# Patient Record
Sex: MALE | Race: WHITE
[De-identification: names, ages, dates, MRNs, and addresses within clinical notes are randomized per-mention and may not be internally consistent; named-entity substitution may affect disease eponyms.]

---

## 2018-02-22 ENCOUNTER — RESULT REVIEW (OUTPATIENT)
Age: 68
End: 2018-02-22

## 2018-02-22 ENCOUNTER — OUTPATIENT (OUTPATIENT)
Dept: OUTPATIENT SERVICES | Facility: HOSPITAL | Age: 68
LOS: 1 days | Discharge: HOME | End: 2018-02-22
Payer: MEDICARE

## 2018-02-22 VITALS
SYSTOLIC BLOOD PRESSURE: 119 MMHG | TEMPERATURE: 97 F | OXYGEN SATURATION: 100 % | WEIGHT: 197.09 LBS | HEIGHT: 74 IN | DIASTOLIC BLOOD PRESSURE: 73 MMHG | RESPIRATION RATE: 16 BRPM | HEART RATE: 56 BPM

## 2018-02-22 DIAGNOSIS — Z98.890 OTHER SPECIFIED POSTPROCEDURAL STATES: Chronic | ICD-10-CM

## 2018-02-22 DIAGNOSIS — S62.91XA UNSPECIFIED FRACTURE OF RIGHT WRIST AND HAND, INITIAL ENCOUNTER FOR CLOSED FRACTURE: Chronic | ICD-10-CM

## 2018-02-22 PROCEDURE — 88305 TISSUE EXAM BY PATHOLOGIST: CPT | Mod: 26

## 2018-02-22 RX ORDER — MELOXICAM 15 MG/1
1 TABLET ORAL
Qty: 0 | Refills: 0 | COMMUNITY

## 2018-02-22 RX ORDER — SODIUM CHLORIDE 9 MG/ML
1000 INJECTION INTRAMUSCULAR; INTRAVENOUS; SUBCUTANEOUS
Qty: 0 | Refills: 0 | Status: DISCONTINUED | OUTPATIENT
Start: 2018-02-22 | End: 2018-03-09

## 2018-02-22 NOTE — ASU PATIENT PROFILE, ADULT - PSH
Hand fracture, right    History of appendectomy    History of arthroscopy of both knees    History of lumbar laminectomy

## 2018-02-26 LAB — SURGICAL PATHOLOGY FINAL REPORT - CH: SIGNIFICANT CHANGE UP

## 2018-03-02 DIAGNOSIS — Z85.828 PERSONAL HISTORY OF OTHER MALIGNANT NEOPLASM OF SKIN: ICD-10-CM

## 2018-03-02 DIAGNOSIS — F17.290 NICOTINE DEPENDENCE, OTHER TOBACCO PRODUCT, UNCOMPLICATED: ICD-10-CM

## 2018-03-02 DIAGNOSIS — K64.8 OTHER HEMORRHOIDS: ICD-10-CM

## 2018-03-02 DIAGNOSIS — K62.1 RECTAL POLYP: ICD-10-CM

## 2018-03-02 DIAGNOSIS — K57.30 DIVERTICULOSIS OF LARGE INTESTINE WITHOUT PERFORATION OR ABSCESS WITHOUT BLEEDING: ICD-10-CM

## 2018-03-02 DIAGNOSIS — Z86.010 PERSONAL HISTORY OF COLONIC POLYPS: ICD-10-CM

## 2018-03-02 DIAGNOSIS — Z09 ENCOUNTER FOR FOLLOW-UP EXAMINATION AFTER COMPLETED TREATMENT FOR CONDITIONS OTHER THAN MALIGNANT NEOPLASM: ICD-10-CM

## 2018-03-02 DIAGNOSIS — M19.90 UNSPECIFIED OSTEOARTHRITIS, UNSPECIFIED SITE: ICD-10-CM

## 2018-03-02 DIAGNOSIS — K63.5 POLYP OF COLON: ICD-10-CM

## 2018-03-02 DIAGNOSIS — E78.5 HYPERLIPIDEMIA, UNSPECIFIED: ICD-10-CM

## 2019-01-05 ENCOUNTER — INPATIENT (INPATIENT)
Facility: HOSPITAL | Age: 69
LOS: 1 days | Discharge: HOME | End: 2019-01-07
Attending: INTERNAL MEDICINE | Admitting: INTERNAL MEDICINE
Payer: MEDICARE

## 2019-01-05 VITALS — WEIGHT: 192.9 LBS | HEIGHT: 75 IN

## 2019-01-05 DIAGNOSIS — Z98.890 OTHER SPECIFIED POSTPROCEDURAL STATES: Chronic | ICD-10-CM

## 2019-01-05 DIAGNOSIS — S62.91XA UNSPECIFIED FRACTURE OF RIGHT WRIST AND HAND, INITIAL ENCOUNTER FOR CLOSED FRACTURE: Chronic | ICD-10-CM

## 2019-01-05 LAB
ALBUMIN SERPL ELPH-MCNC: 3.7 G/DL — SIGNIFICANT CHANGE UP (ref 3.3–5)
ALP SERPL-CCNC: 119 U/L — SIGNIFICANT CHANGE UP (ref 40–120)
ALT FLD-CCNC: 39 U/L — SIGNIFICANT CHANGE UP (ref 12–78)
ANION GAP SERPL CALC-SCNC: 10 MMOL/L — SIGNIFICANT CHANGE UP (ref 5–17)
APTT BLD: 35.7 SEC — SIGNIFICANT CHANGE UP (ref 27.5–36.3)
AST SERPL-CCNC: 31 U/L — SIGNIFICANT CHANGE UP (ref 15–37)
BILIRUB SERPL-MCNC: 0.4 MG/DL — SIGNIFICANT CHANGE UP (ref 0.2–1.2)
BUN SERPL-MCNC: 24 MG/DL — HIGH (ref 7–23)
CALCIUM SERPL-MCNC: 8.5 MG/DL — SIGNIFICANT CHANGE UP (ref 8.5–10.1)
CHLORIDE SERPL-SCNC: 105 MMOL/L — SIGNIFICANT CHANGE UP (ref 96–108)
CO2 SERPL-SCNC: 24 MMOL/L — SIGNIFICANT CHANGE UP (ref 22–31)
CREAT SERPL-MCNC: 1 MG/DL — SIGNIFICANT CHANGE UP (ref 0.5–1.3)
GLUCOSE SERPL-MCNC: 160 MG/DL — HIGH (ref 70–99)
HCT VFR BLD CALC: 44.2 % — SIGNIFICANT CHANGE UP (ref 39–50)
HGB BLD-MCNC: 15 G/DL — SIGNIFICANT CHANGE UP (ref 13–17)
INR BLD: 1.13 RATIO — SIGNIFICANT CHANGE UP (ref 0.88–1.16)
LIDOCAIN IGE QN: 156 U/L — SIGNIFICANT CHANGE UP (ref 73–393)
MAGNESIUM SERPL-MCNC: 2.4 MG/DL — SIGNIFICANT CHANGE UP (ref 1.6–2.6)
MCHC RBC-ENTMCNC: 31.4 PG — SIGNIFICANT CHANGE UP (ref 27–34)
MCHC RBC-ENTMCNC: 33.9 GM/DL — SIGNIFICANT CHANGE UP (ref 32–36)
MCV RBC AUTO: 92.5 FL — SIGNIFICANT CHANGE UP (ref 80–100)
NRBC # BLD: 0 /100 WBCS — SIGNIFICANT CHANGE UP (ref 0–0)
NT-PROBNP SERPL-SCNC: 217 PG/ML — HIGH (ref 0–125)
PLATELET # BLD AUTO: 237 K/UL — SIGNIFICANT CHANGE UP (ref 150–400)
POTASSIUM SERPL-MCNC: 4 MMOL/L — SIGNIFICANT CHANGE UP (ref 3.5–5.3)
POTASSIUM SERPL-SCNC: 4 MMOL/L — SIGNIFICANT CHANGE UP (ref 3.5–5.3)
PROT SERPL-MCNC: 7.3 GM/DL — SIGNIFICANT CHANGE UP (ref 6–8.3)
PROTHROM AB SERPL-ACNC: 12.6 SEC — SIGNIFICANT CHANGE UP (ref 10–12.9)
RBC # BLD: 4.78 M/UL — SIGNIFICANT CHANGE UP (ref 4.2–5.8)
RBC # FLD: 12.7 % — SIGNIFICANT CHANGE UP (ref 10.3–14.5)
SODIUM SERPL-SCNC: 139 MMOL/L — SIGNIFICANT CHANGE UP (ref 135–145)
TROPONIN I SERPL-MCNC: 0.17 NG/ML — HIGH (ref 0.01–0.04)
WBC # BLD: 9.62 K/UL — SIGNIFICANT CHANGE UP (ref 3.8–10.5)
WBC # FLD AUTO: 9.62 K/UL — SIGNIFICANT CHANGE UP (ref 3.8–10.5)

## 2019-01-05 PROCEDURE — 99285 EMERGENCY DEPT VISIT HI MDM: CPT

## 2019-01-05 PROCEDURE — 93010 ELECTROCARDIOGRAM REPORT: CPT

## 2019-01-05 PROCEDURE — 71045 X-RAY EXAM CHEST 1 VIEW: CPT | Mod: 26

## 2019-01-05 RX ORDER — SODIUM CHLORIDE 9 MG/ML
1000 INJECTION INTRAMUSCULAR; INTRAVENOUS; SUBCUTANEOUS ONCE
Qty: 0 | Refills: 0 | Status: COMPLETED | OUTPATIENT
Start: 2019-01-05 | End: 2019-01-05

## 2019-01-05 RX ORDER — ASPIRIN/CALCIUM CARB/MAGNESIUM 324 MG
325 TABLET ORAL ONCE
Qty: 0 | Refills: 0 | Status: COMPLETED | OUTPATIENT
Start: 2019-01-05 | End: 2019-01-05

## 2019-01-05 RX ORDER — APIXABAN 2.5 MG/1
5 TABLET, FILM COATED ORAL EVERY 12 HOURS
Qty: 0 | Refills: 0 | Status: DISCONTINUED | OUTPATIENT
Start: 2019-01-05 | End: 2019-01-06

## 2019-01-05 RX ADMIN — APIXABAN 5 MILLIGRAM(S): 2.5 TABLET, FILM COATED ORAL at 23:14

## 2019-01-05 RX ADMIN — SODIUM CHLORIDE 1000 MILLILITER(S): 9 INJECTION INTRAMUSCULAR; INTRAVENOUS; SUBCUTANEOUS at 23:27

## 2019-01-05 RX ADMIN — SODIUM CHLORIDE 1000 MILLILITER(S): 9 INJECTION INTRAMUSCULAR; INTRAVENOUS; SUBCUTANEOUS at 23:08

## 2019-01-05 RX ADMIN — Medication 325 MILLIGRAM(S): at 22:08

## 2019-01-05 RX ADMIN — SODIUM CHLORIDE 1000 MILLILITER(S): 9 INJECTION INTRAMUSCULAR; INTRAVENOUS; SUBCUTANEOUS at 22:08

## 2019-01-05 RX ADMIN — SODIUM CHLORIDE 1000 MILLILITER(S): 9 INJECTION INTRAMUSCULAR; INTRAVENOUS; SUBCUTANEOUS at 22:27

## 2019-01-05 NOTE — ED ADULT NURSE NOTE - NSIMPLEMENTINTERV_GEN_ALL_ED
Implemented All Universal Safety Interventions:  [place] to call system. Call bell, personal items and telephone within reach. Instruct patient to call for assistance. Room bathroom lighting operational. Non-slip footwear when patient is off stretcher. Physically safe environment: no spills, clutter or unnecessary equipment. Stretcher in lowest position, wheels locked, appropriate side rails in place.

## 2019-01-05 NOTE — ED ADULT NURSE NOTE - OBJECTIVE STATEMENT
pt presents to the ed c/o hr racing, pt states it began around 1300. Pt is in afib rvr, ekg done, set up on monitor. Denies sob / cp

## 2019-01-05 NOTE — ED PROVIDER NOTE - OBJECTIVE STATEMENT
67 yo male with h/o HTN, HLD and afib, on Eliquis c/o chest pain.  Patient had 4-5 hours of chest pain this afternoon with the sensation that he was in afib.  No sob. No fever.  Missed his am dose of lopressor and eliquis, took around 2pm after symptoms had started.  Now symptom free.

## 2019-01-05 NOTE — ED ADULT NURSE NOTE - CHPI ED NUR SYMPTOMS NEG
no vomiting/no nausea/no weakness/no pain/no fever/no chills/no tingling/no decreased eating/drinking/no dizziness

## 2019-01-06 PROBLEM — D09.9 CARCINOMA IN SITU, UNSPECIFIED: Chronic | Status: ACTIVE | Noted: 2018-02-22

## 2019-01-06 PROBLEM — M19.90 UNSPECIFIED OSTEOARTHRITIS, UNSPECIFIED SITE: Chronic | Status: ACTIVE | Noted: 2018-02-22

## 2019-01-06 PROBLEM — E78.5 HYPERLIPIDEMIA, UNSPECIFIED: Chronic | Status: ACTIVE | Noted: 2018-02-22

## 2019-01-06 LAB
ANION GAP SERPL CALC-SCNC: 6 MMOL/L — SIGNIFICANT CHANGE UP (ref 5–17)
BASOPHILS # BLD AUTO: 0.03 K/UL — SIGNIFICANT CHANGE UP (ref 0–0.2)
BASOPHILS NFR BLD AUTO: 0.3 % — SIGNIFICANT CHANGE UP (ref 0–2)
BUN SERPL-MCNC: 24 MG/DL — HIGH (ref 7–23)
CALCIUM SERPL-MCNC: 8 MG/DL — LOW (ref 8.5–10.1)
CHLORIDE SERPL-SCNC: 114 MMOL/L — HIGH (ref 96–108)
CHOLEST SERPL-MCNC: 168 MG/DL — SIGNIFICANT CHANGE UP (ref 10–199)
CO2 SERPL-SCNC: 23 MMOL/L — SIGNIFICANT CHANGE UP (ref 22–31)
CREAT SERPL-MCNC: 0.74 MG/DL — SIGNIFICANT CHANGE UP (ref 0.5–1.3)
EOSINOPHIL # BLD AUTO: 0.16 K/UL — SIGNIFICANT CHANGE UP (ref 0–0.5)
EOSINOPHIL NFR BLD AUTO: 1.8 % — SIGNIFICANT CHANGE UP (ref 0–6)
GLUCOSE SERPL-MCNC: 113 MG/DL — HIGH (ref 70–99)
HCT VFR BLD CALC: 37.7 % — LOW (ref 39–50)
HDLC SERPL-MCNC: 52 MG/DL — SIGNIFICANT CHANGE UP
HGB BLD-MCNC: 12.9 G/DL — LOW (ref 13–17)
IMM GRANULOCYTES NFR BLD AUTO: 0.6 % — SIGNIFICANT CHANGE UP (ref 0–1.5)
LIPID PNL WITH DIRECT LDL SERPL: 94 MG/DL — SIGNIFICANT CHANGE UP
LYMPHOCYTES # BLD AUTO: 3.13 K/UL — SIGNIFICANT CHANGE UP (ref 1–3.3)
LYMPHOCYTES # BLD AUTO: 35.7 % — SIGNIFICANT CHANGE UP (ref 13–44)
MCHC RBC-ENTMCNC: 32.3 PG — SIGNIFICANT CHANGE UP (ref 27–34)
MCHC RBC-ENTMCNC: 34.2 GM/DL — SIGNIFICANT CHANGE UP (ref 32–36)
MCV RBC AUTO: 94.3 FL — SIGNIFICANT CHANGE UP (ref 80–100)
MONOCYTES # BLD AUTO: 0.79 K/UL — SIGNIFICANT CHANGE UP (ref 0–0.9)
MONOCYTES NFR BLD AUTO: 9 % — SIGNIFICANT CHANGE UP (ref 2–14)
NEUTROPHILS # BLD AUTO: 4.61 K/UL — SIGNIFICANT CHANGE UP (ref 1.8–7.4)
NEUTROPHILS NFR BLD AUTO: 52.6 % — SIGNIFICANT CHANGE UP (ref 43–77)
NRBC # BLD: 0 /100 WBCS — SIGNIFICANT CHANGE UP (ref 0–0)
PLATELET # BLD AUTO: 179 K/UL — SIGNIFICANT CHANGE UP (ref 150–400)
POTASSIUM SERPL-MCNC: 4 MMOL/L — SIGNIFICANT CHANGE UP (ref 3.5–5.3)
POTASSIUM SERPL-SCNC: 4 MMOL/L — SIGNIFICANT CHANGE UP (ref 3.5–5.3)
RBC # BLD: 4 M/UL — LOW (ref 4.2–5.8)
RBC # FLD: 12.7 % — SIGNIFICANT CHANGE UP (ref 10.3–14.5)
SODIUM SERPL-SCNC: 143 MMOL/L — SIGNIFICANT CHANGE UP (ref 135–145)
TOTAL CHOLESTEROL/HDL RATIO MEASUREMENT: 3.2 RATIO — LOW (ref 3.4–9.6)
TRIGL SERPL-MCNC: 112 MG/DL — SIGNIFICANT CHANGE UP (ref 10–149)
TROPONIN I SERPL-MCNC: 0.1 NG/ML — HIGH (ref 0.01–0.04)
TROPONIN I SERPL-MCNC: 0.14 NG/ML — HIGH (ref 0.01–0.04)
TSH SERPL-MCNC: 2.22 UU/ML — SIGNIFICANT CHANGE UP (ref 0.34–4.82)
WBC # BLD: 8.77 K/UL — SIGNIFICANT CHANGE UP (ref 3.8–10.5)
WBC # FLD AUTO: 8.77 K/UL — SIGNIFICANT CHANGE UP (ref 3.8–10.5)

## 2019-01-06 RX ORDER — METOPROLOL TARTRATE 50 MG
75 TABLET ORAL DAILY
Qty: 0 | Refills: 0 | Status: DISCONTINUED | OUTPATIENT
Start: 2019-01-06 | End: 2019-01-06

## 2019-01-06 RX ORDER — ASPIRIN/CALCIUM CARB/MAGNESIUM 324 MG
1 TABLET ORAL
Qty: 0 | Refills: 0 | COMMUNITY

## 2019-01-06 RX ORDER — ATORVASTATIN CALCIUM 80 MG/1
40 TABLET, FILM COATED ORAL AT BEDTIME
Qty: 0 | Refills: 0 | Status: DISCONTINUED | OUTPATIENT
Start: 2019-01-06 | End: 2019-01-07

## 2019-01-06 RX ORDER — ACETAMINOPHEN 500 MG
650 TABLET ORAL EVERY 6 HOURS
Qty: 0 | Refills: 0 | Status: DISCONTINUED | OUTPATIENT
Start: 2019-01-06 | End: 2019-01-07

## 2019-01-06 RX ORDER — METOPROLOL TARTRATE 50 MG
25 TABLET ORAL ONCE
Qty: 0 | Refills: 0 | Status: COMPLETED | OUTPATIENT
Start: 2019-01-06 | End: 2019-01-06

## 2019-01-06 RX ORDER — ASPIRIN/CALCIUM CARB/MAGNESIUM 324 MG
81 TABLET ORAL DAILY
Qty: 0 | Refills: 0 | Status: DISCONTINUED | OUTPATIENT
Start: 2019-01-06 | End: 2019-01-07

## 2019-01-06 RX ORDER — ATORVASTATIN CALCIUM 80 MG/1
1 TABLET, FILM COATED ORAL
Qty: 0 | Refills: 0 | COMMUNITY

## 2019-01-06 RX ORDER — METOPROLOL TARTRATE 50 MG
50 TABLET ORAL EVERY 12 HOURS
Qty: 0 | Refills: 0 | Status: DISCONTINUED | OUTPATIENT
Start: 2019-01-06 | End: 2019-01-07

## 2019-01-06 RX ORDER — PREGABALIN 225 MG/1
1 CAPSULE ORAL
Qty: 0 | Refills: 0 | COMMUNITY

## 2019-01-06 RX ORDER — SODIUM CHLORIDE 9 MG/ML
1000 INJECTION INTRAMUSCULAR; INTRAVENOUS; SUBCUTANEOUS
Qty: 0 | Refills: 0 | Status: DISCONTINUED | OUTPATIENT
Start: 2019-01-06 | End: 2019-01-06

## 2019-01-06 RX ADMIN — SODIUM CHLORIDE 100 MILLILITER(S): 9 INJECTION INTRAMUSCULAR; INTRAVENOUS; SUBCUTANEOUS at 01:26

## 2019-01-06 RX ADMIN — Medication 81 MILLIGRAM(S): at 11:50

## 2019-01-06 RX ADMIN — APIXABAN 5 MILLIGRAM(S): 2.5 TABLET, FILM COATED ORAL at 08:53

## 2019-01-06 RX ADMIN — ATORVASTATIN CALCIUM 40 MILLIGRAM(S): 80 TABLET, FILM COATED ORAL at 22:07

## 2019-01-06 RX ADMIN — Medication 25 MILLIGRAM(S): at 08:53

## 2019-01-06 RX ADMIN — Medication 75 MILLIGRAM(S): at 05:21

## 2019-01-06 RX ADMIN — Medication 50 MILLIGRAM(S): at 17:36

## 2019-01-06 NOTE — H&P ADULT - HISTORY OF PRESENT ILLNESS
68 y.o. male with PMH HLD and AFib presents with chest pain and palpitations. Pt reports symptoms started around 1:30pm while at a store. He felt dizzy with "tunnel vision". Pt sat down and felt better. Pt reports chest pain was substernal, pressure like, nonradiating, constant with varying intensity. Pt reports initial 8/10 pain, now 2/10. Denies fever, chills, abd pain, dysuria or diarrhea. Pt reports recent cold symptoms that is resolving.    PMH: as above  PSH: R hand fracture, appendectomy, knee arthroscopy, laminectomy  Social Hx: occ cigar use, no cigarette, occ cocktail alcohol, no drugs  Family Hx: Mother-Alzheimer's; 2 sister with DM  ROS: per HPI

## 2019-01-06 NOTE — CONSULT NOTE ADULT - ASSESSMENT
68 y.o. male with PMH HLD and AFib presents with chest pain and palpitations. Pt reports symptoms started around 1:30pm while at a store. He felt dizzy with "tunnel vision". Pt sat down and felt better. Pt reports chest pain was substernal, pressure like, nonradiating, constant with varying intensity. Pt reports initial 8/10 pain, now 2/10. Denies fever, chills, abd pain, dysuria or diarrhea. Pt reports recent cold symptoms that is resolving.    #chest pain with elevated troponin (TnI 0.174)  -admit to tele  -elevated trop, needs cath tomorrow, I called Dr. Campbell  -ELIGIO given  -cont Eliquis  -check TSH  -lipid panel  -IV hydration  -cardio consult, Dr Johnson    #palpitations due to rapid AFib  -CHADSVASc 1  -pt received cardizem 20mg iv in ED, 5mg iv  -cont metoprolol XL  -cont Eliquis    #HLD   -on statin    #DVT ppx

## 2019-01-06 NOTE — H&P ADULT - NSHPPHYSICALEXAM_GEN_ALL_CORE
Vital Signs Last 24 Hrs  T(C): 36.6 (05 Jan 2019 21:33), Max: 36.6 (05 Jan 2019 21:33)  T(F): 97.8 (05 Jan 2019 21:33), Max: 97.8 (05 Jan 2019 21:33)  HR: 106 (06 Jan 2019 01:18) (72 - 106)  BP: 106/69 (06 Jan 2019 01:18) (106/69 - 129/83)  BP(mean): --  RR: 16 (05 Jan 2019 21:33) (16 - 16)  SpO2: 97% (05 Jan 2019 21:33) (97% - 97%)    GEN: appears comfortable  Neuro: AAOx3, nonfocal  HEENT: NC/AT, EOMI  Neck: no thyroidmegaly, no JVD  Cardiovascular: S1S2 present, irregularly irregular rhythm, no murmur, no tenderness on palpation  Respiratory: breath sounds normal bilaterally, no wheezing, no rales, no rhonchi  Gastrointestinal: bowel sounds normal, soft, no abdominal tenderness  Musculoskeletal: no muscle tenderness  Extremities: No edema  Skin: No rash

## 2019-01-06 NOTE — H&P ADULT - ASSESSMENT
68 y.o. male with PMH HLD and AFib presents with chest pain and palpitations. Pt reports symptoms started around 1:30pm while at a store. He felt dizzy with "tunnel vision". Pt sat down and felt better. Pt reports chest pain was substernal, pressure like, nonradiating, constant with varying intensity. Pt reports initial 8/10 pain, now 2/10. Denies fever, chills, abd pain, dysuria or diarrhea. Pt reports recent cold symptoms that is resolving.    #chest pain with elevated troponin (TnI 0.174)  -admit to tele  -serial EKG and cardiac enzymes  -ASA given  -cont Eliquis  -check TSH  -lipid panel  -IV hydration  -cardio consult, Dr Johnson    #palpitations due to rapid AFib  -CHADSVASc 1  -pt received cardizem 20mg iv in ED, 5mg iv  -cont metoprolol XL  -cont Eliquis    #HLD   -on statin    #DVT ppx  -on Eliquis    IMPROVE VTE Individual Risk Assessment    RISK                                                                Points    [  ] Previous VTE                                                  3    [  ] Thrombophilia                                               2    [  ] Lower limb paralysis                                      2        (unable to hold up >15 seconds)      [  ] Current Cancer                                              2         (within 6 months)    [  ] Immobilization > 24 hrs                                1    [  ] ICU/CCU stay > 24 hours                              1    [ x ] Age > 60                                                      1    IMPROVE VTE Score ______1___

## 2019-01-06 NOTE — CONSULT NOTE ADULT - SUBJECTIVE AND OBJECTIVE BOX
CHIEF COMPLAINT:    HPI:  68 y.o. male with PMH HLD and AFib presents with chest pain and palpitations. Pt reports symptoms started around 1:30pm while at a store. He felt dizzy with "tunnel vision". Pt sat down and felt better. Pt reports chest pain was substernal, pressure like, nonradiating, constant with varying intensity. Pt reports initial 8/10 pain, now 2/10. Denies fever, chills, abd pain, dysuria or diarrhea. Pt reports recent cold symptoms that is resolving.    EKG on admission showed AF with , no acute changes. Trop elevated x3. Pt had chest pain before admission and near syncope. Stress nuclear in office done by Dr. Muhammad last year reportedly negative. Pt needs cath tomorrow. NPO after midnight. I called Dr. Campbell to cath pt tomorrow. Hold Eliquis tonight and in am, and restart after cath. Pt understands and agrees to cath. AF with  on monitor. Increase metoprolol.    PMH: as above  PSH: R hand fracture, appendectomy, knee arthroscopy, laminectomy  Social Hx: occ cigar use, no cigarette, occ cocktail alcohol, no drugs  Family Hx: Mother-Alzheimer's; 2 sister with DM  ROS: per HPI (06 Jan 2019 01:21)      PAST MEDICAL & SURGICAL HISTORY:  Atrial fibrillation  Hyperlipidemia  CA in situ  Arthritis  History of lumbar laminectomy  History of arthroscopy of both knees  History of appendectomy  Hand fracture, right      Allergies    No Known Allergies    Intolerances        Occupation:  Alochol: Denied  Smoking: Denied  Drug Use: Denied  Marital Status:         FAMILY HISTORY:  No pertinent family history in first degree relatives      REVIEW OF SYSTEMS:    CONSTITUTIONAL: No weakness, fevers or chills  EYES/ENT: No visual changes;  No vertigo or throat pain   NECK: No pain or stiffness  RESPIRATORY: No cough, wheezing, hemoptysis; No shortness of breath  CARDIOVASCULAR: No chest pain or palpitations  GASTROINTESTINAL: No abdominal or epigastric pain. No nausea, vomiting, or hematemesis; No diarrhea or constipation. No melena or hematochezia.  GENITOURINARY: No dysuria, frequency or hematuria  NEUROLOGICAL: No numbness or weakness  SKIN: No itching, burning, rashes, or lesions   All other review of systems is negative unless indicated above    Vital Signs Last 24 Hrs  T(C): 36.7 (06 Jan 2019 05:56), Max: 36.7 (06 Jan 2019 02:07)  T(F): 98 (06 Jan 2019 05:56), Max: 98.1 (06 Jan 2019 02:07)  HR: 97 (06 Jan 2019 05:56) (72 - 108)  BP: 100/61 (06 Jan 2019 05:56) (98/64 - 139/86)  BP(mean): --  RR: 17 (06 Jan 2019 05:56) (16 - 17)  SpO2: 97% (06 Jan 2019 05:56) (95% - 97%)    I&O's Summary      PHYSICAL EXAM:    Constitutional: NAD, awake and alert, well-developed  HEENT: PERR, EOMI,  No oral cyananosis.  Neck:  supple,  No JVD  Respiratory: Breath sounds are clear bilaterally, No wheezing, rales or rhonchi  Cardiovascular: S1 and S2, regular rate and rhythm, no Murmurs, gallops or rubs  Gastrointestinal: Bowel Sounds present, soft, nontender.   Extremities: No peripheral edema. No clubbing or cyanosis.  Vascular: 2+ peripheral pulses  Neurological: A/O x 3, no focal deficits  Musculoskeletal: no calf tenderness.  Skin: No rashes.    MEDICATIONS:  MEDICATIONS  (STANDING):  aspirin enteric coated 81 milliGRAM(s) Oral daily  atorvastatin 40 milliGRAM(s) Oral at bedtime      LABS: All Labs Reviewed:                        12.9   8.77  )-----------( 179      ( 06 Jan 2019 04:38 )             37.7                         15.0   9.62  )-----------( 237      ( 05 Jan 2019 21:52 )             44.2     06 Jan 2019 04:38    143    |  114    |  24     ----------------------------<  113    4.0     |  23     |  0.74   05 Jan 2019 21:52    139    |  105    |  24     ----------------------------<  160    4.0     |  24     |  1.00     Ca    8.0        06 Jan 2019 04:38  Ca    8.5        05 Jan 2019 21:52  Mg     2.4       05 Jan 2019 21:52    TPro  7.3    /  Alb  3.7    /  TBili  0.4    /  DBili  x      /  AST  31     /  ALT  39     /  AlkPhos  119    05 Jan 2019 21:52    PT/INR - ( 05 Jan 2019 21:52 )   PT: 12.6 sec;   INR: 1.13 ratio         PTT - ( 05 Jan 2019 21:52 )  PTT:35.7 sec  CARDIAC MARKERS ( 06 Jan 2019 04:38 )  0.098 ng/mL / x     / x     / x     / x      CARDIAC MARKERS ( 06 Jan 2019 02:19 )  0.137 ng/mL / x     / x     / x     / x      CARDIAC MARKERS ( 05 Jan 2019 21:52 )  0.174 ng/mL / x     / x     / x     / x          Blood Culture:   01-05 @ 21:52  Pro Bnp 217    01-06 @ 04:38  TSH: 2.22      RADIOLOGY/EKG:

## 2019-01-07 ENCOUNTER — TRANSCRIPTION ENCOUNTER (OUTPATIENT)
Age: 69
End: 2019-01-07

## 2019-01-07 VITALS
SYSTOLIC BLOOD PRESSURE: 126 MMHG | OXYGEN SATURATION: 97 % | RESPIRATION RATE: 16 BRPM | HEART RATE: 60 BPM | DIASTOLIC BLOOD PRESSURE: 73 MMHG

## 2019-01-07 LAB
ANION GAP SERPL CALC-SCNC: 5 MMOL/L — SIGNIFICANT CHANGE UP (ref 5–17)
BASOPHILS # BLD AUTO: 0.04 K/UL — SIGNIFICANT CHANGE UP (ref 0–0.2)
BASOPHILS NFR BLD AUTO: 0.6 % — SIGNIFICANT CHANGE UP (ref 0–2)
BUN SERPL-MCNC: 21 MG/DL — SIGNIFICANT CHANGE UP (ref 7–23)
CALCIUM SERPL-MCNC: 8.1 MG/DL — LOW (ref 8.5–10.1)
CHLORIDE SERPL-SCNC: 111 MMOL/L — HIGH (ref 96–108)
CO2 SERPL-SCNC: 27 MMOL/L — SIGNIFICANT CHANGE UP (ref 22–31)
CREAT SERPL-MCNC: 0.84 MG/DL — SIGNIFICANT CHANGE UP (ref 0.5–1.3)
EOSINOPHIL # BLD AUTO: 0.22 K/UL — SIGNIFICANT CHANGE UP (ref 0–0.5)
EOSINOPHIL NFR BLD AUTO: 3.3 % — SIGNIFICANT CHANGE UP (ref 0–6)
GLUCOSE SERPL-MCNC: 134 MG/DL — HIGH (ref 70–99)
HCT VFR BLD CALC: 38.5 % — LOW (ref 39–50)
HGB BLD-MCNC: 12.8 G/DL — LOW (ref 13–17)
IMM GRANULOCYTES NFR BLD AUTO: 0.5 % — SIGNIFICANT CHANGE UP (ref 0–1.5)
LYMPHOCYTES # BLD AUTO: 2.37 K/UL — SIGNIFICANT CHANGE UP (ref 1–3.3)
LYMPHOCYTES # BLD AUTO: 35.6 % — SIGNIFICANT CHANGE UP (ref 13–44)
MAGNESIUM SERPL-MCNC: 2.2 MG/DL — SIGNIFICANT CHANGE UP (ref 1.6–2.6)
MCHC RBC-ENTMCNC: 31.2 PG — SIGNIFICANT CHANGE UP (ref 27–34)
MCHC RBC-ENTMCNC: 33.2 GM/DL — SIGNIFICANT CHANGE UP (ref 32–36)
MCV RBC AUTO: 93.9 FL — SIGNIFICANT CHANGE UP (ref 80–100)
MONOCYTES # BLD AUTO: 0.63 K/UL — SIGNIFICANT CHANGE UP (ref 0–0.9)
MONOCYTES NFR BLD AUTO: 9.5 % — SIGNIFICANT CHANGE UP (ref 2–14)
NEUTROPHILS # BLD AUTO: 3.37 K/UL — SIGNIFICANT CHANGE UP (ref 1.8–7.4)
NEUTROPHILS NFR BLD AUTO: 50.5 % — SIGNIFICANT CHANGE UP (ref 43–77)
NRBC # BLD: 0 /100 WBCS — SIGNIFICANT CHANGE UP (ref 0–0)
PHOSPHATE SERPL-MCNC: 3.2 MG/DL — SIGNIFICANT CHANGE UP (ref 2.5–4.5)
PLATELET # BLD AUTO: 178 K/UL — SIGNIFICANT CHANGE UP (ref 150–400)
POTASSIUM SERPL-MCNC: 4.1 MMOL/L — SIGNIFICANT CHANGE UP (ref 3.5–5.3)
POTASSIUM SERPL-SCNC: 4.1 MMOL/L — SIGNIFICANT CHANGE UP (ref 3.5–5.3)
RBC # BLD: 4.1 M/UL — LOW (ref 4.2–5.8)
RBC # FLD: 12.8 % — SIGNIFICANT CHANGE UP (ref 10.3–14.5)
SODIUM SERPL-SCNC: 143 MMOL/L — SIGNIFICANT CHANGE UP (ref 135–145)
WBC # BLD: 6.66 K/UL — SIGNIFICANT CHANGE UP (ref 3.8–10.5)
WBC # FLD AUTO: 6.66 K/UL — SIGNIFICANT CHANGE UP (ref 3.8–10.5)

## 2019-01-07 PROCEDURE — 93306 TTE W/DOPPLER COMPLETE: CPT | Mod: 26

## 2019-01-07 RX ORDER — METOPROLOL TARTRATE 50 MG
1 TABLET ORAL
Qty: 30 | Refills: 0
Start: 2019-01-07 | End: 2019-01-21

## 2019-01-07 RX ORDER — METOPROLOL TARTRATE 50 MG
1.5 TABLET ORAL
Qty: 0 | Refills: 0 | COMMUNITY

## 2019-01-07 RX ADMIN — Medication 81 MILLIGRAM(S): at 08:17

## 2019-01-07 NOTE — DISCHARGE NOTE ADULT - PATIENT PORTAL LINK FT
You can access the [facility] Patient Portal, offered by [facility], by registering with the following website: [email or web address]

## 2019-01-07 NOTE — DISCHARGE NOTE ADULT - MEDICATION SUMMARY - MEDICATIONS TO TAKE
I will START or STAY ON the medications listed below when I get home from the hospital:    aspirin 81 mg oral tablet  -- 1 tab(s) by mouth once a day  -- Indication: For cad    Eliquis 5 mg oral tablet  -- 1 tab(s) by mouth 2 times a day  -- Indication: For home med for atrial fibrillation    Lipitor 40 mg oral tablet  -- 1 tab(s) by mouth once a day (at bedtime)  -- Indication: For home med for  cholesterol    metoprolol succinate 50 mg oral tablet, extended release  -- 1 tab(s) by mouth every 12 hours  -- Indication: For Atrial fibrillation, dose changed    Vitamin D3 2000 intl units oral tablet  -- 1 tab(s) by mouth once a day  -- Indication: For home med

## 2019-01-07 NOTE — DISCHARGE NOTE ADULT - PLAN OF CARE
rate control take meds as advised, follow up with Dr. Muhammad in 1 week. Your metoprolol dose was increased to control your heart rate.

## 2019-01-07 NOTE — DISCHARGE NOTE ADULT - CARE PLAN
Principal Discharge DX:	Atrial fibrillation  Goal:	rate control  Assessment and plan of treatment:	take meds as advised, follow up with Dr. Muhammad in 1 week. Your metoprolol dose was increased to control your heart rate.

## 2019-01-07 NOTE — DISCHARGE NOTE ADULT - CARE PROVIDERS DIRECT ADDRESSES
[email or web address]

## 2019-01-07 NOTE — DISCHARGE NOTE ADULT - MEDICATION SUMMARY - MEDICATIONS TO CHANGE
I will SWITCH the dose or number of times a day I take the medications listed below when I get home from the hospital:    Toprol-XL 50 mg oral tablet, extended release  -- 1.5 tab(s) by mouth once a day

## 2019-01-07 NOTE — DISCHARGE NOTE ADULT - CARE PROVIDER_API CALL
Raquel Muhammad), Cardiovascular Disease; Internal Medicine  [address] [place] [address]  Phone: [phone]  Fax: [phone]

## 2019-01-07 NOTE — PROGRESS NOTE ADULT - SUBJECTIVE AND OBJECTIVE BOX
Cath Lab Nurse Practitioner Note  HPI:  68 y.o. male with PMH HLD and AFib presents with chest pain and palpitations. Pt reports symptoms started around 1:30pm on 1/5 while at a store. He felt dizzy with "tunnel vision". Pt sat down and felt better. Pt reports chest pain was substernal, pressure like, nonradiating, constant with varying intensity. Pt reports initial 8/10 pain, now 2/10. Denies fever, chills, abd pain, dysuria or diarrhea. Pt reports recent cold symptoms that is resolving.  Pt found to have Afib with RVR & (+)trop.  Pt referred for LHC with possible intervention.    s/p LHC :non obstructive CAD  Pt denies chest pain/SOB/palpitations post cath.    Physical exam:  Vital Signs Last 24 Hrs  T(C): 36.6 (07 Jan 2019 08:55), Max: 37 (06 Jan 2019 20:34)  T(F): 97.9 (07 Jan 2019 08:55), Max: 98.6 (06 Jan 2019 20:34)  HR: 64 (07 Jan 2019 08:55) (56 - 93)  BP: 123/98 (07 Jan 2019 08:55) (99/55 - 129/93)  RR: 18 (07 Jan 2019 08:55) (16 - 18)  SpO2: 99% (07 Jan 2019 08:55) (95% - 99%)  Cardiac : (+)S1S2, RRR  Lungs: CTA B/L  Abd: soft, NT, (+)BS  Ext: Rt groin (+) perclose closure device , 2+ Rt DP    Labs:                        12.8   6.66  )-----------( 178      ( 07 Jan 2019 05:28 )             38.5     01-07    143  |  111<H>  |  21  ----------------------------<  134<H>  4.1   |  27  |  0.84    Ca    8.1<L>      07 Jan 2019 05:28  Phos  3.2     01-07  Mg     2.2     01-07    TPro  7.3  /  Alb  3.7  /  TBili  0.4  /  DBili  x   /  AST  31  /  ALT  39  /  AlkPhos  119  01-05    PT/INR - ( 05 Jan 2019 21:52 )   PT: 12.6 sec;   INR: 1.13 ratio         PTT - ( 05 Jan 2019 21:52 )  PTT:35.7 sec  MEDICATIONS  (STANDING):  aspirin enteric coated 81 milliGRAM(s) Oral daily  atorvastatin 40 milliGRAM(s) Oral at bedtime  metoprolol succinate ER 50 milliGRAM(s) Oral every 12 hours      A/P : s/p LHC : Non obstructive CAD  -encourage po hydration  -medical Mx for CAD  -resume eliquis tonight  -Plan discussed with pt/Dr. Campbell/Dr. Whalen.

## 2019-01-07 NOTE — DISCHARGE NOTE ADULT - HOSPITAL COURSE
68 y.o. male with PMH HLD and AFib presents with chest pain and palpitations. Pt reports symptoms started around 1:30pm while at a store. He felt dizzy with "tunnel vision". Pt sat down and felt better. Pt reports chest pain was substernal, pressure like, nonradiating, constant with varying intensity. Pt reports initial 8/10 pain, now 2/10. Denies fever, chills, abd pain, dysuria or diarrhea. He was admitted with rapid a fib, elevated troponins, r/o acs. His bb was increased for better rate control, He underwent cath today which showed non obstructive disease. He is hemodynamically stable and will be discharged home today. he is advised to f/u with Dr. Muhammad in one week    Vital Signs Last 24 Hrs  T(C): 36.6 (07 Jan 2019 14:00), Max: 37 (06 Jan 2019 20:34)  T(F): 97.9 (07 Jan 2019 14:00), Max: 98.6 (06 Jan 2019 20:34)  HR: 60 (07 Jan 2019 15:15) (53 - 82)  BP: 126/73 (07 Jan 2019 15:15) (99/55 - 136/86)  BP(mean): --  RR: 16 (07 Jan 2019 15:15) (16 - 18)  SpO2: 97% (07 Jan 2019 15:15) (95% - 99%)    PHYSICAL EXAM:    GENERAL: Comfortable, no acute distress  HEAD:  Atraumatic, Normocephalic  EYES: EOMI, PERRLA  HEENT: Moist mucous membranes  NECK: Supple, No JVD  NERVOUS SYSTEM:  Alert & Oriented X3, Motor Strength 5/5 B/L upper and lower extremities  CHEST/LUNG: Clear to auscultation bilaterally  HEART: Regular rate and rhythm; No murmurs, rubs, or gallops  ABDOMEN: Soft, Nontender, Nondistended; Bowel sounds present  GENITOURINARY- Voiding, no palpable bladder  EXTREMITIES:  No clubbing, cyanosis, or edema  MUSCULOSKEETAL- No muscle tenderness, No joint tenderness  SKIN-no rash    LABS:                        12.8   6.66  )-----------( 178      ( 07 Jan 2019 05:28 )             38.5     01-07    143  |  111<H>  |  21  ----------------------------<  134<H>  4.1   |  27  |  0.84    Ca    8.1<L>      07 Jan 2019 05:28  Phos  3.2     01-07  Mg     2.2     01-07    TPro  7.3  /  Alb  3.7  /  TBili  0.4  /  DBili  x   /  AST  31  /  ALT  39  /  AlkPhos  119  01-05  PT/INR - ( 05 Jan 2019 21:52 )   PT: 12.6 sec;   INR: 1.13 ratio    PTT - ( 05 Jan 2019 21:52 )  PTT:35.7 sec  0.098 ng/mL / x     / x     / x     / x      CARDIAC MARKERS ( 06 Jan 2019 02:19 )  0.137 ng/mL / x     / x     / x     / x      CARDIAC MARKERS ( 05 Jan 2019 21:52 )  0.174 ng/mL / x     / x     / x     / x         Cardiac Cath Lab - Adult (01.07.19 @ 10:01) >   Impression     Diagnostic Conclusions     Non obstructive coronary artery disease.   Normal LV systolic function. Estimated LV ejection fraction is 65 %.   No aortic valve stenosis.    FINAL DIAGNOSIS:  1. Atrial fibrillation with RVR  2. Chest pain/elevated troponins likely related to demand ischemia   3. HLD    time taken for dc 60 min. plan d/w patient, Dr. Campbell and DR. Ayala.

## 2019-01-07 NOTE — PACU DISCHARGE NOTE - COMMENTS
Patient educated on discharge instructions including medication regime, activity level, follow up appointments, and the signs and symptoms requiring the notification of their provider.  Verbalized understanding utilizing the teach back method. patient placed on monitor, rhythm confirmed.

## 2019-01-09 DIAGNOSIS — I48.91 UNSPECIFIED ATRIAL FIBRILLATION: ICD-10-CM

## 2019-01-09 DIAGNOSIS — Z79.01 LONG TERM (CURRENT) USE OF ANTICOAGULANTS: ICD-10-CM

## 2019-01-09 DIAGNOSIS — I24.8 OTHER FORMS OF ACUTE ISCHEMIC HEART DISEASE: ICD-10-CM

## 2019-01-09 DIAGNOSIS — I25.10 ATHEROSCLEROTIC HEART DISEASE OF NATIVE CORONARY ARTERY WITHOUT ANGINA PECTORIS: ICD-10-CM

## 2019-01-09 DIAGNOSIS — E78.5 HYPERLIPIDEMIA, UNSPECIFIED: ICD-10-CM

## 2019-02-18 ENCOUNTER — EMERGENCY (EMERGENCY)
Facility: HOSPITAL | Age: 69
LOS: 0 days | Discharge: HOME | End: 2019-02-18
Attending: STUDENT IN AN ORGANIZED HEALTH CARE EDUCATION/TRAINING PROGRAM | Admitting: STUDENT IN AN ORGANIZED HEALTH CARE EDUCATION/TRAINING PROGRAM
Payer: MEDICARE

## 2019-02-18 VITALS
DIASTOLIC BLOOD PRESSURE: 78 MMHG | HEART RATE: 66 BPM | RESPIRATION RATE: 19 BRPM | SYSTOLIC BLOOD PRESSURE: 126 MMHG | TEMPERATURE: 98 F | OXYGEN SATURATION: 100 %

## 2019-02-18 VITALS — WEIGHT: 195.11 LBS | HEIGHT: 75 IN

## 2019-02-18 DIAGNOSIS — Z98.890 OTHER SPECIFIED POSTPROCEDURAL STATES: Chronic | ICD-10-CM
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
## 2019-02-18 DIAGNOSIS — I48.91 UNSPECIFIED ATRIAL FIBRILLATION: ICD-10-CM
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
## 2019-02-18 DIAGNOSIS — Z79.899 OTHER LONG TERM (CURRENT) DRUG THERAPY: ICD-10-CM

## 2019-02-18 DIAGNOSIS — S62.91XA UNSPECIFIED FRACTURE OF RIGHT WRIST AND HAND, INITIAL ENCOUNTER FOR CLOSED FRACTURE: Chronic | ICD-10-CM

## 2019-02-18 DIAGNOSIS — Z79.82 LONG TERM (CURRENT) USE OF ASPIRIN: ICD-10-CM

## 2019-02-18 DIAGNOSIS — E78.5 HYPERLIPIDEMIA, UNSPECIFIED: ICD-10-CM

## 2019-02-18 DIAGNOSIS — S81.812A LACERATION WITHOUT FOREIGN BODY, LEFT LOWER LEG, INITIAL ENCOUNTER: ICD-10-CM

## 2019-02-18 PROCEDURE — 12002 RPR S/N/AX/GEN/TRNK2.6-7.5CM: CPT

## 2019-02-18 PROCEDURE — 99284 EMERGENCY DEPT VISIT MOD MDM: CPT | Mod: 25

## 2019-02-18 RX ORDER — CEPHALEXIN 500 MG
500 CAPSULE ORAL ONCE
Qty: 0 | Refills: 0 | Status: COMPLETED | OUTPATIENT
Start: 2019-02-18 | End: 2019-02-18

## 2019-02-18 RX ORDER — CEPHALEXIN 500 MG
1 CAPSULE ORAL
Qty: 28 | Refills: 0
Start: 2019-02-18 | End: 2019-02-24

## 2019-02-18 RX ORDER — TETANUS TOXOID, REDUCED DIPHTHERIA TOXOID AND ACELLULAR PERTUSSIS VACCINE, ADSORBED 5; 2.5; 8; 8; 2.5 [IU]/.5ML; [IU]/.5ML; UG/.5ML; UG/.5ML; UG/.5ML
0.5 SUSPENSION INTRAMUSCULAR ONCE
Qty: 0 | Refills: 0 | Status: COMPLETED | OUTPATIENT
Start: 2019-02-18 | End: 2019-02-18

## 2019-02-18 RX ADMIN — Medication 500 MILLIGRAM(S): at 15:53

## 2019-02-18 RX ADMIN — TETANUS TOXOID, REDUCED DIPHTHERIA TOXOID AND ACELLULAR PERTUSSIS VACCINE, ADSORBED 0.5 MILLILITER(S): 5; 2.5; 8; 8; 2.5 SUSPENSION INTRAMUSCULAR at 14:47

## 2019-02-18 NOTE — ED STATDOCS - OBJECTIVE STATEMENT
69 y/o male with a PMHx of Afib, HLD presents to the ED with laceration to LLE. Pt cut his LLE on plastic, now with laceration. No other injury noted. No fever or any other acute complaints at this time.

## 2019-02-18 NOTE — ED STATDOCS - ATTENDING CONTRIBUTION TO CARE
I, Kaylee Back DO,  performed the initial face to face bedside interview with this patient regarding history of present illness, review of symptoms and relevant past medical, social and family history.  I completed an independent physical examination.  I was the initial provider who evaluated this patient. I have signed out the follow up of any pending tests (i.e. labs, radiological studies) to the ACP.  I have communicated the patient’s plan of care and disposition with the ACP.  The history, relevant review of systems, past medical and surgical history, medical decision making, and physical examination was documented by the scribe in my presence and I attest to the accuracy of the documentation.

## 2019-02-18 NOTE — ED ADULT NURSE NOTE - NSFALLRSKASSESSDT_ED_ALL_ED
Signatures   Electronically signed by : ALICIA Titus; Sep 26 2018 12:18PM CST    Electronically signed by : NA KANG M.D.; Sep 26 2018 12:40PM CST     18-Feb-2019 14:49

## 2019-09-20 NOTE — ED PROVIDER NOTE - CONSTITUTIONAL, MLM
See result note   normal... Well appearing, well nourished, awake, alert, oriented to person, place, time/situation and in no apparent distress.

## 2019-12-14 NOTE — ASU PREOP CHECKLIST - NS PREOP CHK MONITOR ANESTHESIA CONSENT
DVT: heparin gtt for possible TAVR procedure  Diet: Consistent carb and DASH  Dispo: PT eval done - not on home medication, diet controlled  - Check A1c  - ISS

## 2020-05-01 ENCOUNTER — EMERGENCY (EMERGENCY)
Facility: HOSPITAL | Age: 70
LOS: 0 days | Discharge: HOME | End: 2020-05-01
Attending: EMERGENCY MEDICINE
Payer: MEDICARE

## 2020-05-01 VITALS
TEMPERATURE: 98 F | HEART RATE: 70 BPM | DIASTOLIC BLOOD PRESSURE: 68 MMHG | OXYGEN SATURATION: 100 % | WEIGHT: 175.05 LBS | RESPIRATION RATE: 14 BRPM | SYSTOLIC BLOOD PRESSURE: 123 MMHG

## 2020-05-01 VITALS
RESPIRATION RATE: 16 BRPM | DIASTOLIC BLOOD PRESSURE: 76 MMHG | TEMPERATURE: 98 F | SYSTOLIC BLOOD PRESSURE: 111 MMHG | OXYGEN SATURATION: 100 % | HEART RATE: 72 BPM

## 2020-05-01 DIAGNOSIS — S62.91XA UNSPECIFIED FRACTURE OF RIGHT WRIST AND HAND, INITIAL ENCOUNTER FOR CLOSED FRACTURE: Chronic | ICD-10-CM

## 2020-05-01 DIAGNOSIS — R07.89 OTHER CHEST PAIN: ICD-10-CM

## 2020-05-01 DIAGNOSIS — M19.90 UNSPECIFIED OSTEOARTHRITIS, UNSPECIFIED SITE: ICD-10-CM

## 2020-05-01 DIAGNOSIS — E78.5 HYPERLIPIDEMIA, UNSPECIFIED: ICD-10-CM

## 2020-05-01 DIAGNOSIS — Z98.890 OTHER SPECIFIED POSTPROCEDURAL STATES: Chronic | ICD-10-CM

## 2020-05-01 DIAGNOSIS — Z79.01 LONG TERM (CURRENT) USE OF ANTICOAGULANTS: ICD-10-CM

## 2020-05-01 DIAGNOSIS — D09.9 CARCINOMA IN SITU, UNSPECIFIED: ICD-10-CM

## 2020-05-01 DIAGNOSIS — I48.91 UNSPECIFIED ATRIAL FIBRILLATION: ICD-10-CM

## 2020-05-01 LAB
ALBUMIN SERPL ELPH-MCNC: 3.6 G/DL — SIGNIFICANT CHANGE UP (ref 3.3–5)
ALP SERPL-CCNC: 113 U/L — SIGNIFICANT CHANGE UP (ref 40–120)
ALT FLD-CCNC: 28 U/L — SIGNIFICANT CHANGE UP (ref 12–78)
ANION GAP SERPL CALC-SCNC: 5 MMOL/L — SIGNIFICANT CHANGE UP (ref 5–17)
APTT BLD: 36.2 SEC — SIGNIFICANT CHANGE UP (ref 27.5–36.3)
AST SERPL-CCNC: 21 U/L — SIGNIFICANT CHANGE UP (ref 15–37)
BASOPHILS # BLD AUTO: 0.03 K/UL — SIGNIFICANT CHANGE UP (ref 0–0.2)
BASOPHILS NFR BLD AUTO: 0.3 % — SIGNIFICANT CHANGE UP (ref 0–2)
BILIRUB SERPL-MCNC: 0.5 MG/DL — SIGNIFICANT CHANGE UP (ref 0.2–1.2)
BUN SERPL-MCNC: 20 MG/DL — SIGNIFICANT CHANGE UP (ref 7–23)
CALCIUM SERPL-MCNC: 8.7 MG/DL — SIGNIFICANT CHANGE UP (ref 8.5–10.1)
CHLORIDE SERPL-SCNC: 109 MMOL/L — HIGH (ref 96–108)
CO2 SERPL-SCNC: 27 MMOL/L — SIGNIFICANT CHANGE UP (ref 22–31)
CREAT SERPL-MCNC: 1.01 MG/DL — SIGNIFICANT CHANGE UP (ref 0.5–1.3)
EOSINOPHIL # BLD AUTO: 0.16 K/UL — SIGNIFICANT CHANGE UP (ref 0–0.5)
EOSINOPHIL NFR BLD AUTO: 1.7 % — SIGNIFICANT CHANGE UP (ref 0–6)
GLUCOSE SERPL-MCNC: 144 MG/DL — HIGH (ref 70–99)
HCT VFR BLD CALC: 41.6 % — SIGNIFICANT CHANGE UP (ref 39–50)
HGB BLD-MCNC: 14.1 G/DL — SIGNIFICANT CHANGE UP (ref 13–17)
IMM GRANULOCYTES NFR BLD AUTO: 0.3 % — SIGNIFICANT CHANGE UP (ref 0–1.5)
LYMPHOCYTES # BLD AUTO: 2.83 K/UL — SIGNIFICANT CHANGE UP (ref 1–3.3)
LYMPHOCYTES # BLD AUTO: 30.1 % — SIGNIFICANT CHANGE UP (ref 13–44)
MCHC RBC-ENTMCNC: 31.7 PG — SIGNIFICANT CHANGE UP (ref 27–34)
MCHC RBC-ENTMCNC: 33.9 GM/DL — SIGNIFICANT CHANGE UP (ref 32–36)
MCV RBC AUTO: 93.5 FL — SIGNIFICANT CHANGE UP (ref 80–100)
MONOCYTES # BLD AUTO: 0.77 K/UL — SIGNIFICANT CHANGE UP (ref 0–0.9)
MONOCYTES NFR BLD AUTO: 8.2 % — SIGNIFICANT CHANGE UP (ref 2–14)
NEUTROPHILS # BLD AUTO: 5.57 K/UL — SIGNIFICANT CHANGE UP (ref 1.8–7.4)
NEUTROPHILS NFR BLD AUTO: 59.4 % — SIGNIFICANT CHANGE UP (ref 43–77)
PLATELET # BLD AUTO: 176 K/UL — SIGNIFICANT CHANGE UP (ref 150–400)
POTASSIUM SERPL-MCNC: 4.3 MMOL/L — SIGNIFICANT CHANGE UP (ref 3.5–5.3)
POTASSIUM SERPL-SCNC: 4.3 MMOL/L — SIGNIFICANT CHANGE UP (ref 3.5–5.3)
PROT SERPL-MCNC: 7 GM/DL — SIGNIFICANT CHANGE UP (ref 6–8.3)
RBC # BLD: 4.45 M/UL — SIGNIFICANT CHANGE UP (ref 4.2–5.8)
RBC # FLD: 12.2 % — SIGNIFICANT CHANGE UP (ref 10.3–14.5)
SARS-COV-2 RNA SPEC QL NAA+PROBE: SIGNIFICANT CHANGE UP
SODIUM SERPL-SCNC: 141 MMOL/L — SIGNIFICANT CHANGE UP (ref 135–145)
TROPONIN I SERPL-MCNC: 0.06 NG/ML — HIGH (ref 0.01–0.04)
WBC # BLD: 9.39 K/UL — SIGNIFICANT CHANGE UP (ref 3.8–10.5)
WBC # FLD AUTO: 9.39 K/UL — SIGNIFICANT CHANGE UP (ref 3.8–10.5)

## 2020-05-01 PROCEDURE — 93010 ELECTROCARDIOGRAM REPORT: CPT

## 2020-05-01 PROCEDURE — 71045 X-RAY EXAM CHEST 1 VIEW: CPT | Mod: 26

## 2020-05-01 PROCEDURE — 85730 THROMBOPLASTIN TIME PARTIAL: CPT

## 2020-05-01 PROCEDURE — 93005 ELECTROCARDIOGRAM TRACING: CPT

## 2020-05-01 PROCEDURE — 80053 COMPREHEN METABOLIC PANEL: CPT

## 2020-05-01 PROCEDURE — 85025 COMPLETE CBC W/AUTO DIFF WBC: CPT

## 2020-05-01 PROCEDURE — 87635 SARS-COV-2 COVID-19 AMP PRB: CPT

## 2020-05-01 PROCEDURE — 96374 THER/PROPH/DIAG INJ IV PUSH: CPT

## 2020-05-01 PROCEDURE — 99284 EMERGENCY DEPT VISIT MOD MDM: CPT

## 2020-05-01 PROCEDURE — 71045 X-RAY EXAM CHEST 1 VIEW: CPT

## 2020-05-01 PROCEDURE — 96375 TX/PRO/DX INJ NEW DRUG ADDON: CPT

## 2020-05-01 PROCEDURE — 36415 COLL VENOUS BLD VENIPUNCTURE: CPT

## 2020-05-01 PROCEDURE — 84484 ASSAY OF TROPONIN QUANT: CPT

## 2020-05-01 PROCEDURE — 99284 EMERGENCY DEPT VISIT MOD MDM: CPT | Mod: 25

## 2020-05-01 RX ORDER — ONDANSETRON 8 MG/1
4 TABLET, FILM COATED ORAL ONCE
Refills: 0 | Status: COMPLETED | OUTPATIENT
Start: 2020-05-01 | End: 2020-05-01

## 2020-05-01 RX ORDER — FAMOTIDINE 10 MG/ML
20 INJECTION INTRAVENOUS ONCE
Refills: 0 | Status: COMPLETED | OUTPATIENT
Start: 2020-05-01 | End: 2020-05-01

## 2020-05-01 RX ORDER — LIDOCAINE 4 G/100G
10 CREAM TOPICAL ONCE
Refills: 0 | Status: COMPLETED | OUTPATIENT
Start: 2020-05-01 | End: 2020-05-01

## 2020-05-01 RX ADMIN — ONDANSETRON 4 MILLIGRAM(S): 8 TABLET, FILM COATED ORAL at 15:52

## 2020-05-01 RX ADMIN — LIDOCAINE 10 MILLILITER(S): 4 CREAM TOPICAL at 15:52

## 2020-05-01 RX ADMIN — Medication 30 MILLILITER(S): at 15:52

## 2020-05-01 RX ADMIN — FAMOTIDINE 20 MILLIGRAM(S): 10 INJECTION INTRAVENOUS at 15:52

## 2020-05-01 NOTE — ED PROVIDER NOTE - NS_ ATTENDINGSCRIBEDETAILS _ED_A_ED_FT
I, Dwayne Yi MD,  performed the initial face to face bedside interview with this patient regarding history of present illness, review of symptoms and relevant past medical, social and family history.  I completed an independent physical examination.    The history, relevant review of systems, past medical and surgical history, medical decision making, and physical examination was documented by the scribe in my presence and I attest to the accuracy of the documentation.

## 2020-05-01 NOTE — ED ADULT NURSE NOTE - NSIMPLEMENTINTERV_GEN_ALL_ED
Implemented All Universal Safety Interventions:  [place] to call system. Call bell, personal items and telephone within reach. Instruct patient to call for assistance. Room bathroom lighting operational. Non-slip footwear when patient is off stretcher. Physically safe environment: no spills, clutter or unnecessary equipment. Stretcher in lowest position, wheels locked, appropriate side rails in place.

## 2020-05-01 NOTE — ED PROVIDER NOTE - CLINICAL SUMMARY MEDICAL DECISION MAKING FREE TEXT BOX
70 y/o male with a PMHx of afib and HLD presents to the ED c/o CP for the past week. Plan - labs, CXR, reassess.

## 2020-05-01 NOTE — ED PROVIDER NOTE - PROGRESS NOTE DETAILS
Patient examined at bedside with Dr. Yi. 68 y/o M with PMh of a-fib on eliquis presents with 1 week of left sided CP radiating into left arm. States he had ablation 3 years ago, notes no recurrences of a-fib since that time. Denies fever, chills, nausea, vomiting, SOB. Cards: Dr. Johnson. PE: Well appearing. Cardiac: s1s2, RRR. lungs: CTAB. Abdomen: NBS x4, soft, nontender. PV: No LE edema or calf tenderness. Will r/o ACS. Plan for EKG, CXR, labs, reassess. - Fuad Raines PA-C

## 2020-05-01 NOTE — ED PROVIDER NOTE - PATIENT PORTAL LINK FT
You can access the FollowMyHealth Patient Portal offered by [facility] by registering at the following website: [email or web address]. By joining [facility]’s FollowMyHealth portal, you will also be able to view your health information using other applications (apps) compatible with our system.

## 2020-05-01 NOTE — ED ADULT NURSE NOTE - OBJECTIVE STATEMENT
Pt presents to the ED for evaluation of intermittent L sided chest pain x1 week. Pt states the pain comes and goes, and when he burps it feels better. Pt has hx of a-fib, has had ablation's in the past. Pt currently takes cardizem and plavix for a-fib. Pt presents to the ED for evaluation of intermittent L sided chest pain x1 week with + radiation to L arm. Pt states the pain comes and goes, and when he burps it feels better. Pt has hx of a-fib, has had ablation's in the past. Pt currently takes cardizem and plavix for a-fib. Pt denies sob/dizziness. Pt presents to the ED for evaluation of intermittent L sided chest pain x1 week with + radiation to L arm. Pt states the pain comes and goes, and when he burps it feels better. Pt has hx of a-fib, has had ablation's in the past. Pt currently takes cardizem and eliquis for a-fib. Pt denies sob/dizziness.

## 2020-05-01 NOTE — ED PROVIDER NOTE - OBJECTIVE STATEMENT
68 y/o male with a PMHx of afib and HLD presents to ER c/o CP for the past week. The pain is in the left sided chest anterior wall with radiation down his left arm. Pt denies SOB and fevers. PMD Dr. Nunu Johnson, his cardiologist. Pt states he currently still has pain. Pt advised by family member to receive an ER evaluation. Pt has minimal exposure to COVID.

## 2020-05-01 NOTE — ED ADULT NURSE NOTE - ED STAT RN HANDOFF DETAILS
Pt states he feels much more improved s/p taking the GI medications. Pt denies chest pains at this time, VSS. Pt states he feels ready to be discharged. Pt states he understands d/c education and follow up plan of care.

## 2020-05-05 ENCOUNTER — TRANSCRIPTION ENCOUNTER (OUTPATIENT)
Age: 70
End: 2020-05-05

## 2020-05-05 ENCOUNTER — INPATIENT (INPATIENT)
Facility: HOSPITAL | Age: 70
LOS: 0 days | Discharge: HOME | DRG: 281 | End: 2020-05-05
Attending: HOSPITALIST | Admitting: HOSPITALIST
Payer: MEDICARE

## 2020-05-05 VITALS
TEMPERATURE: 99 F | SYSTOLIC BLOOD PRESSURE: 127 MMHG | OXYGEN SATURATION: 97 % | RESPIRATION RATE: 19 BRPM | HEART RATE: 57 BPM | WEIGHT: 205.03 LBS | DIASTOLIC BLOOD PRESSURE: 68 MMHG

## 2020-05-05 VITALS
HEART RATE: 60 BPM | DIASTOLIC BLOOD PRESSURE: 77 MMHG | SYSTOLIC BLOOD PRESSURE: 133 MMHG | RESPIRATION RATE: 16 BRPM | OXYGEN SATURATION: 96 %

## 2020-05-05 DIAGNOSIS — Z98.890 OTHER SPECIFIED POSTPROCEDURAL STATES: Chronic | ICD-10-CM

## 2020-05-05 DIAGNOSIS — I21.4 NON-ST ELEVATION (NSTEMI) MYOCARDIAL INFARCTION: ICD-10-CM

## 2020-05-05 DIAGNOSIS — S62.91XA UNSPECIFIED FRACTURE OF RIGHT WRIST AND HAND, INITIAL ENCOUNTER FOR CLOSED FRACTURE: Chronic | ICD-10-CM

## 2020-05-05 DIAGNOSIS — I48.20 CHRONIC ATRIAL FIBRILLATION, UNSPECIFIED: ICD-10-CM

## 2020-05-05 LAB
ALBUMIN SERPL ELPH-MCNC: 3.8 G/DL — SIGNIFICANT CHANGE UP (ref 3.3–5)
ALP SERPL-CCNC: 105 U/L — SIGNIFICANT CHANGE UP (ref 40–120)
ALT FLD-CCNC: 31 U/L — SIGNIFICANT CHANGE UP (ref 12–78)
ANION GAP SERPL CALC-SCNC: 7 MMOL/L — SIGNIFICANT CHANGE UP (ref 5–17)
APTT BLD: 37.4 SEC — HIGH (ref 27.5–36.3)
AST SERPL-CCNC: 22 U/L — SIGNIFICANT CHANGE UP (ref 15–37)
BASOPHILS # BLD AUTO: 0.03 K/UL — SIGNIFICANT CHANGE UP (ref 0–0.2)
BASOPHILS NFR BLD AUTO: 0.4 % — SIGNIFICANT CHANGE UP (ref 0–2)
BILIRUB SERPL-MCNC: 0.7 MG/DL — SIGNIFICANT CHANGE UP (ref 0.2–1.2)
BUN SERPL-MCNC: 17 MG/DL — SIGNIFICANT CHANGE UP (ref 7–23)
CALCIUM SERPL-MCNC: 8.5 MG/DL — SIGNIFICANT CHANGE UP (ref 8.5–10.1)
CHLORIDE SERPL-SCNC: 108 MMOL/L — SIGNIFICANT CHANGE UP (ref 96–108)
CK SERPL-CCNC: 140 U/L — SIGNIFICANT CHANGE UP (ref 26–308)
CO2 SERPL-SCNC: 26 MMOL/L — SIGNIFICANT CHANGE UP (ref 22–31)
CREAT SERPL-MCNC: 0.87 MG/DL — SIGNIFICANT CHANGE UP (ref 0.5–1.3)
EOSINOPHIL # BLD AUTO: 0.17 K/UL — SIGNIFICANT CHANGE UP (ref 0–0.5)
EOSINOPHIL NFR BLD AUTO: 2.4 % — SIGNIFICANT CHANGE UP (ref 0–6)
GLUCOSE SERPL-MCNC: 116 MG/DL — HIGH (ref 70–99)
HCT VFR BLD CALC: 43.4 % — SIGNIFICANT CHANGE UP (ref 39–50)
HGB BLD-MCNC: 14.6 G/DL — SIGNIFICANT CHANGE UP (ref 13–17)
IMM GRANULOCYTES NFR BLD AUTO: 0.3 % — SIGNIFICANT CHANGE UP (ref 0–1.5)
INR BLD: 1.17 RATIO — HIGH (ref 0.88–1.16)
LIDOCAIN IGE QN: 142 U/L — SIGNIFICANT CHANGE UP (ref 73–393)
LYMPHOCYTES # BLD AUTO: 2.57 K/UL — SIGNIFICANT CHANGE UP (ref 1–3.3)
LYMPHOCYTES # BLD AUTO: 36 % — SIGNIFICANT CHANGE UP (ref 13–44)
MCHC RBC-ENTMCNC: 31.9 PG — SIGNIFICANT CHANGE UP (ref 27–34)
MCHC RBC-ENTMCNC: 33.6 GM/DL — SIGNIFICANT CHANGE UP (ref 32–36)
MCV RBC AUTO: 94.8 FL — SIGNIFICANT CHANGE UP (ref 80–100)
MONOCYTES # BLD AUTO: 0.69 K/UL — SIGNIFICANT CHANGE UP (ref 0–0.9)
MONOCYTES NFR BLD AUTO: 9.7 % — SIGNIFICANT CHANGE UP (ref 2–14)
NEUTROPHILS # BLD AUTO: 3.66 K/UL — SIGNIFICANT CHANGE UP (ref 1.8–7.4)
NEUTROPHILS NFR BLD AUTO: 51.2 % — SIGNIFICANT CHANGE UP (ref 43–77)
NT-PROBNP SERPL-SCNC: 75 PG/ML — SIGNIFICANT CHANGE UP (ref 0–125)
PLATELET # BLD AUTO: 203 K/UL — SIGNIFICANT CHANGE UP (ref 150–400)
POTASSIUM SERPL-MCNC: 3.9 MMOL/L — SIGNIFICANT CHANGE UP (ref 3.5–5.3)
POTASSIUM SERPL-SCNC: 3.9 MMOL/L — SIGNIFICANT CHANGE UP (ref 3.5–5.3)
PROT SERPL-MCNC: 7.5 GM/DL — SIGNIFICANT CHANGE UP (ref 6–8.3)
PROTHROM AB SERPL-ACNC: 13.1 SEC — HIGH (ref 10–12.9)
RBC # BLD: 4.58 M/UL — SIGNIFICANT CHANGE UP (ref 4.2–5.8)
RBC # FLD: 12.4 % — SIGNIFICANT CHANGE UP (ref 10.3–14.5)
SARS-COV-2 RNA SPEC QL NAA+PROBE: SIGNIFICANT CHANGE UP
SODIUM SERPL-SCNC: 141 MMOL/L — SIGNIFICANT CHANGE UP (ref 135–145)
TROPONIN I SERPL-MCNC: 0.06 NG/ML — HIGH (ref 0.01–0.04)
WBC # BLD: 7.14 K/UL — SIGNIFICANT CHANGE UP (ref 3.8–10.5)
WBC # FLD AUTO: 7.14 K/UL — SIGNIFICANT CHANGE UP (ref 3.8–10.5)

## 2020-05-05 PROCEDURE — 93010 ELECTROCARDIOGRAM REPORT: CPT

## 2020-05-05 PROCEDURE — C1760: CPT

## 2020-05-05 PROCEDURE — 71045 X-RAY EXAM CHEST 1 VIEW: CPT | Mod: 26

## 2020-05-05 PROCEDURE — 93458 L HRT ARTERY/VENTRICLE ANGIO: CPT

## 2020-05-05 PROCEDURE — 99223 1ST HOSP IP/OBS HIGH 75: CPT | Mod: AI

## 2020-05-05 PROCEDURE — G0269: CPT

## 2020-05-05 PROCEDURE — C1887: CPT

## 2020-05-05 PROCEDURE — C1769: CPT

## 2020-05-05 PROCEDURE — C1894: CPT

## 2020-05-05 RX ORDER — APIXABAN 2.5 MG/1
1 TABLET, FILM COATED ORAL
Qty: 0 | Refills: 0 | DISCHARGE

## 2020-05-05 RX ORDER — ATORVASTATIN CALCIUM 80 MG/1
40 TABLET, FILM COATED ORAL AT BEDTIME
Refills: 0 | Status: DISCONTINUED | OUTPATIENT
Start: 2020-05-05 | End: 2020-05-05

## 2020-05-05 RX ORDER — ASPIRIN/CALCIUM CARB/MAGNESIUM 324 MG
162 TABLET ORAL ONCE
Refills: 0 | Status: COMPLETED | OUTPATIENT
Start: 2020-05-05 | End: 2020-05-05

## 2020-05-05 RX ORDER — NITROGLYCERIN 6.5 MG
0.4 CAPSULE, EXTENDED RELEASE ORAL ONCE
Refills: 0 | Status: COMPLETED | OUTPATIENT
Start: 2020-05-05 | End: 2020-05-05

## 2020-05-05 RX ORDER — ASPIRIN/CALCIUM CARB/MAGNESIUM 324 MG
81 TABLET ORAL DAILY
Refills: 0 | Status: DISCONTINUED | OUTPATIENT
Start: 2020-05-05 | End: 2020-05-05

## 2020-05-05 RX ORDER — METOPROLOL TARTRATE 50 MG
50 TABLET ORAL EVERY 12 HOURS
Refills: 0 | Status: DISCONTINUED | OUTPATIENT
Start: 2020-05-05 | End: 2020-05-05

## 2020-05-05 RX ORDER — ONDANSETRON 8 MG/1
4 TABLET, FILM COATED ORAL EVERY 6 HOURS
Refills: 0 | Status: DISCONTINUED | OUTPATIENT
Start: 2020-05-05 | End: 2020-05-05

## 2020-05-05 RX ORDER — ACETAMINOPHEN 500 MG
650 TABLET ORAL EVERY 6 HOURS
Refills: 0 | Status: DISCONTINUED | OUTPATIENT
Start: 2020-05-05 | End: 2020-05-05

## 2020-05-05 RX ADMIN — Medication 162 MILLIGRAM(S): at 08:21

## 2020-05-05 NOTE — ED PROVIDER NOTE - CLINICAL SUMMARY MEDICAL DECISION MAKING FREE TEXT BOX
CP comes & goes randomly & radiates to LUE, sometimes exertional. sent by cardio Dr Campbell for cath

## 2020-05-05 NOTE — ED PROVIDER NOTE - NS ED ATTENDING STATEMENT MOD
Attending Only Posterior Auricular Interpolation Flap Text: A decision was made to reconstruct the defect utilizing an interpolation axial flap and a staged reconstruction.  A telfa template was made of the defect.  This telfa template was then used to outline the posterior auricular interpolation flap.  The donor area for the pedicle flap was then injected with anesthesia.  The flap was excised through the skin and subcutaneous tissue down to the layer of the underlying musculature.  The pedicle flap was carefully excised within this deep plane to maintain its blood supply.  The edges of the donor site were undermined.   The donor site was closed in a primary fashion.  The pedicle was then rotated into position and sutured.  Once the tube was sutured into place, adequate blood supply was confirmed with blanching and refill.  The pedicle was then wrapped with xeroform gauze and dressed appropriately with a telfa and gauze bandage to ensure continued blood supply and protect the attached pedicle.

## 2020-05-05 NOTE — DISCHARGE NOTE PROVIDER - CARE PROVIDER_API CALL
Alessandra Johnson (MD)  Cardiovascular Disease; Internal Medicine  [address] [place] [address]  Phone: [phone]  Fax: [phone]  Established Patient  Follow Up Time: 2 weeks

## 2020-05-05 NOTE — H&P ADULT - ASSESSMENT
A:  Chest pain r/o ACS  Afib on eliquis (last dose 5/4)    P:  Mildly elevated troponin  With +angina  ASA given in ED  Hold eliquis, plan for cardiac cath today  Well Criteria: 0  Low likelihood for PE given history and vitals  Cont supplemental O2 PRN if O2S < 96%  Statin, BB. Pain control  Maintain NPO for above

## 2020-05-05 NOTE — H&P ADULT - NSHPLABSRESULTS_GEN_ALL_CORE
LABS: All Labs Reviewed:                        14.6   7.14  )-----------( 203      ( 05 May 2020 07:04 )             43.4     05-05    141  |  108  |  17  ----------------------------<  116<H>  3.9   |  26  |  0.87    Ca    8.5      05 May 2020 07:04    TPro  7.5  /  Alb  3.8  /  TBili  0.7  /  DBili  x   /  AST  22  /  ALT  31  /  AlkPhos  105  05-05    PT/INR - ( 05 May 2020 07:04 )   PT: 13.1 sec;   INR: 1.17 ratio         PTT - ( 05 May 2020 07:04 )  PTT:37.4 sec  CARDIAC MARKERS ( 05 May 2020 07:04 )  0.060 ng/mL / x     / 140 U/L / x     / x              Blood Culture:             < from: Xray Chest 1 View AP/PA. (05.05.20 @ 07:31) >    IMPRESSION:    Clear lungs.    < end of copied text >

## 2020-05-05 NOTE — DISCHARGE NOTE PROVIDER - NSDCCPTREATMENT_GEN_ALL_CORE_FT
PRINCIPAL PROCEDURE  Procedure: Cardiac catheterization, left heart  Findings and Treatment: Diagnostic Conclusions   Non obstructive coronary artery disease.   Normal LV systolic function. Estimated LV ejection fraction is 65 %.   No aortic valve stenosis.   Elevated left ventricular end-diastolic pressure 20 mm Hg.   Recommendations   Manage with medical therapy.

## 2020-05-05 NOTE — ED PROVIDER NOTE - PROGRESS NOTE DETAILS
mila: Discussed need to admit with patient & discussed risk and benefits.  Patient agreed to admission.  Discussed case w/ admitting doctor - agreed to admit to their service. will place bridge orders. Accepting physician APPROVED PT TO MOVE    prior to inpatient team evaluation

## 2020-05-05 NOTE — ED PROVIDER NOTE - OBJECTIVE STATEMENT
Pertinent HPI/PMH/PSH/FHx/SHx and Review of Systems contained within  HPI:  Patient p/w CC   CP x 14 days, new onset, intermittent. seen in ED 4d ago with CP & had 2 mildly elevated troponins .051 & .064  PMH/PSH relevant for:  Afib on eliquis s/p ablation, HLD  ROS negative for: fever, SOB, Nausea, vomiting, diarrhea, abdominal pain, dysuria    Social Hx: occ cigar use, no cigarette, occ cocktail alcohol, no drugs  Family Hx: Mother-Alzheimer's; 2 sister with DM Pertinent HPI/PMH/PSH/FHx/SHx and Review of Systems contained within  HPI:  Patient p/w CC   CP x 14 days, new onset, intermittent. CP comes & goes randomly & radiates to LUE, sometimes exertional. Chest pain:  is , non pleuritic, doesn't radiate to jaw, arms nor back, not associated with SOB, nausea/vomitting nor diaphoresis nor Lower extremity edema. seen in ED 4d ago with CP & had 2 mildly elevated troponins .051 & .064, sent by Dr Campbell for cardiac cath today  PMH/PSH relevant for:  Afib on eliquis s/p ablation, HLD  ROS negative for: fever, SOB, Nausea, vomiting, diarrhea, abdominal pain, cough  Social Hx: occ cigar use, no cigarette, occ cocktail alcohol, no drugs  Family Hx: Mother-Alzheimer's; 2 sister with DM

## 2020-05-05 NOTE — DISCHARGE NOTE PROVIDER - NSDCFUADDINST_GEN_ALL_CORE_FT
You may shower tomorrow. , no baths/swimming x one week. No heavy lifting greater than 5-10 pounds with right wrist x one week. No strenuous activity x 3 weeks. Monitor site of procedure and notify your doctor for any redness, swelling, discharge    Bandage: Keep bandage clean and dry. Remove after 24 hours.     Special Instructions: procedure was done in your leg ( groin), limit stair climbing for 48 hours. If you have bleeding from the procedure site: Lie down and remove the bandage. Apply hard pressure and call your doctor. If bleeding and swelling cannot be controlled, call 911Call your doctor immediately if: 1) You have severe pain, swelling, or bruising at the procedure site. A small amount or soreness and bruising (black and blue) is normal. 2) Procedure site becomes very red or feels hot to touch. 3) Your hand becomes blue or feels cold to touch. 4)You feel sudden back or belly pain. 5)You develop fever

## 2020-05-05 NOTE — CONSULT NOTE ADULT - ASSESSMENT
Chest pain.   CAD with unstable angina.   HTN  HLD  h/o AF, s/p ablation      Suggest:    Cardiac monitor  O2 supplement  Follow up Cardiac enzymes  Antiplatelet therapy  Beta blockers  Statins  Nitrates PRN  Echocardiogram done at Dr Johnson's office - will get records  Ischemia evaluation  - Based on pt's symptoms, history, risk factors, exam, lab and EKG data I have advised pt have a cardiac catheterization and possible percutaneous coronary intervention. Procedure, its risks, alternatives, benefits and potential complications were discussed in detail. Risks include but not limited to bleeding, infection, allergy, renal failure requiring dialysis, stroke, vascular injury, pericardial tamponade, arrhythmias, MI and even death. Pt is agreeable and has consented for the procedure and the procedure is being scheduled for today.

## 2020-05-05 NOTE — H&P ADULT - NSHPREVIEWOFSYSTEMS_GEN_ALL_CORE
REVIEW OF SYSTEMS:    CONSTITUTIONAL: No weakness, fevers or chills  EYES/ENT: No visual changes;  No vertigo or throat pain   NECK: No pain or stiffness  RESPIRATORY: No cough, wheezing, hemoptysis; No shortness of breath  CARDIOVASCULAR: +cp  GASTROINTESTINAL: No abdominal or epigastric pain. No nausea, vomiting, or hematemesis; No diarrhea or constipation. No melena or hematochezia.  GENITOURINARY: No dysuria, frequency or hematuria  NEUROLOGICAL: No numbness or weakness  SKIN: No itching, burning, rashes, or lesions   All other review of systems is negative unless indicated above

## 2020-05-05 NOTE — H&P ADULT - HISTORY OF PRESENT ILLNESS
70 yo M with pmh Afib on eliquis s/p ablation, hyperlipidemia p/w c/o chest pain x 2 weeks. Patient endorsing SS CP that radiates to left upper extremity with associated jaw pain. Pain is non pleuritic, non positional and not reproducible. Denies cough or fevers. No calf pain or hemoptysis. Recently seen in ED 4 days ago for similarr complaints. Trop was mildly elevated. Trop today 0.06. No dynamic ekg changes  No N/V/D. no diaphoresis. COVID (-)  	      Social Hx: occ cigar use, no cigarette, occ cocktail alcohol, no drugs  Family Hx: Mother-Alzheimer's; 2 sister with DM  Shx: ablation

## 2020-05-05 NOTE — H&P ADULT - NSHPPHYSICALEXAM_GEN_ALL_CORE
ICU Vital Signs Last 24 Hrs  T(C): 36.4 (05 May 2020 08:31), Max: 37.1 (05 May 2020 06:47)  T(F): 97.6 (05 May 2020 08:31), Max: 98.8 (05 May 2020 06:47)  HR: 61 (05 May 2020 07:15) (57 - 61)  BP: 154/85 (05 May 2020 07:15) (127/68 - 154/85)  BP(mean): --  ABP: --  ABP(mean): --  RR: 16 (05 May 2020 07:15) (16 - 19)  SpO2: 99% (05 May 2020 07:15) (97% - 99%)      PHYSICAL EXAM:    Constitutional: NAD, awake and alert, well-developed  HEENT: PERR, EOMI, Normal Hearing, MMM  Neck: Soft and supple, No LAD, No JVD  Respiratory: Breath sounds are clear bilaterally, No wheezing, rales or rhonchi  Cardiovascular: S1 and S2, regular rate and rhythm, no Murmurs, gallops or rubs  Gastrointestinal: Bowel Sounds present, soft, nontender, nondistended, no guarding, no rebound  Extremities: No peripheral edema  Vascular: 2+ peripheral pulses  Neurological: A/O x 3, no focal deficits  Musculoskeletal: 5/5 strength b/l upper and lower extremities  Skin: No rashes

## 2020-05-05 NOTE — ED PROVIDER NOTE - NS ED ROS FT
Review of Systems:  	•	CONSTITUTIONAL: no fever  	•	SKIN: no rash  	•	RESPIRATORY: no shortness of breath  	•	CARDIAC: chest pain  	•	GI:  no abd pain, no nausea, no vomiting, no diarrhea  	•	GENITO-URINARY:  no dysuria  	•	MUSCULOSKELETAL:  no back pain  	•	NEUROLOGIC: no weakness  	•	ALLERGY: no rhinorrhea  	•	PSYSCHIATRIC: appropriate concern about symptoms

## 2020-05-05 NOTE — CONSULT NOTE ADULT - ATTENDING COMMENTS
Case discussed with the team and all records reviewed. Greater than 50% of the visit was for coordination of care.; Total face to face time:   60  minutes. Time is exclusive of billed procedures for the same day of service

## 2020-05-05 NOTE — PROGRESS NOTE ADULT - SUBJECTIVE AND OBJECTIVE BOX
Nurse Practitioner Progress note:     HPI:  70 yo M with pmh Afib on eliquis s/p ablation, hyperlipidemia p/w c/o chest pain x 2 weeks. Patient endorsing SS CP that radiates to left upper extremity with associated jaw pain. Pain is non pleuritic, non positional and not reproducible. Denies cough or fevers. No calf pain or hemoptysis. Recently seen in ED 4 days ago for similarr complaints. Trop was mildly elevated. Trop today 0.06. No dynamic ekg changes  No N/V/D. no diaphoresis. COVID (-)  	      T(C): 36.4 HR: 57   BP: 147/76   RR: 16   SpO2: 100%    PHYSICAL EXAM:  NEURO: Non-focal, AxOx3.  No neuro deficits NECK: Supple, No JVD, No LAD  CHEST/LUNG: Clear to auscultation bilaterally; No wheeze  HEART: s1 s2 Regular rate and rhythm; No murmurs, rubs, or gallops  ABDOMEN: Soft, Nontender, Nondistended; Bowel sounds present X 4 quadrants   EXTREMITIES:  2+ Peripheral Pulses, No clubbing, cyanosis, or edema   VASCULAR: Peripheral pulses palpable 2+ bilaterally  PROCEDURE SITE: right femoral groin accessed, site closed via perclose device. Site is without hematoma or bleeding. Sensation and ABDULAZIZ intact. Distal pulses palpable 2+, capillary refill < 2 seconds. Patient denies pain, numbness, tingling, CP or SOB. Clean dry dressing applied     PROCEDURE RESULTS: full report to follow     ASSESSMENT: HPI:  70 yo M with pmh Afib on eliquis s/p ablation, hyperlipidemia p/w c/o chest pain x 2 weeks. Patient endorsing SS CP that radiates to left upper extremity with associated jaw pain. Pain is non pleuritic, non positional and not reproducible. Denies cough or fevers. No calf pain or hemoptysis. Recently seen in ED 4 days ago for similarr complaints. Trop was mildly elevated. Trop today 0.06. No dynamic ekg changes  No N/V/D. no diaphoresis. COVID (-)  	  S/p LHC revealing non obstructive CAD    PLAN:  -VS, diet, activity as per post cath orders  -Encourage PO fluids  -Continue current medications; Eliquis can resume tonight  -Plan of care discussed with patient and Dr. Campbell and Dr. Hardy.  -Post cath instructions reviewed, patient verbalizes and understands instructions  - Patient to be discharged home, recommend following up with cardiologist in 2 weeks

## 2020-05-05 NOTE — PACU DISCHARGE NOTE - COMMENTS
Patient educated on discharge instructions including medication regime, activity level, follow up appointments, and the signs and symptoms requiring the notification of their provider.  Verbalized understanding utilizing the teach back method.  Written instructions provided as well.

## 2020-05-05 NOTE — ED PROVIDER NOTE - CARE PLAN
Principal Discharge DX:	Chest pain, moderate coronary artery risk Principal Discharge DX:	NSTEMI (non-ST elevated myocardial infarction)  Secondary Diagnosis:	Chest pain, moderate coronary artery risk

## 2020-05-05 NOTE — CONSULT NOTE ADULT - SUBJECTIVE AND OBJECTIVE BOX
COVERING FOR Dr Nunu Johnson    Chief complaint of chest pain.    HPI:   68 yo male with left sided chest pain radiating to the jaw and the ulnar aspect of the left arm. Not always exertional.  Pain is non pleuritic, non positional and not reproducible. Denies cough or fevers. No calf pain or hemoptysis. Recently seen in ED for the same pain and there was mildly abnormal cardiac enzymes noted. Previous h/o non obstructive CAD.  COVID negative 4 days ago.       PAST MEDICAL & SURGICAL HISTORY:  Atrial fibrillation, s/p ablation. On Eliquis  Hyperlipidemia  CA in situ  Arthritis  History of lumbar laminectomy  History of arthroscopy of both knees  History of appendectomy  Hand fracture, right      PREVIOUS CARDIAC WORKUP:      Cardiac Cath:  1/7/19 Non obstructive CAD      ALLERGIES:    No Known Allergies       MEDICATIONS  (STANDING):  aspirin  chewable 81 milliGRAM(s) Oral daily  atorvastatin 40 milliGRAM(s) Oral at bedtime  metoprolol succinate ER 50 milliGRAM(s) Oral every 12 hours    MEDICATIONS  (PRN):  acetaminophen   Tablet .. 650 milliGRAM(s) Oral every 6 hours PRN Mild Pain (1 - 3)  ondansetron Injectable 4 milliGRAM(s) IV Push every 6 hours PRN Nausea      FAMILY HISTORY:  No pertinent family history in first degree relatives        SOCIAL HISTORY:  Cigar smoker. No ETOH abuse. No illicit drugs.     ROS:     Detailed ten system ROS was performed and was negative except for history as eluded to above.    no fever  no chills  no nausea  no vomiting  no diarrhea  no constipation  no melena  no hematochezia  + chest pain  no palpitations  no sob at rest  no dyspnea on exertion  no cough  no wheezing  no anorexia  no headache  no dizziness  no syncope  no weakness  no myalgia  no dysuria  no polyuria  no hematuria     Vital Signs Last 24 Hrs  T(C): 36.4 (05 May 2020 08:34), Max: 37.1 (05 May 2020 06:47)  T(F): 97.6 (05 May 2020 08:34), Max: 98.8 (05 May 2020 06:47)  HR: 57 (05 May 2020 08:34) (57 - 61)  BP: 147/76 (05 May 2020 08:34) (127/68 - 154/85)  RR: 16 (05 May 2020 08:34) (16 - 19)  SpO2: 100% (05 May 2020 08:34) (97% - 100%)      PHYSICAL EXAM:    General:                Comfortable, AAO X 3, in no distress.  HEENT:                  Atraumatic, PERRLA, EOMI, conjunctiva clear.    Neck:                     Supple, no adenopathy, no thyromegaly, no JVD, no bruit.  Back:                     Symmetric, non tender.  Chest:                    Clear, B/L symmetric air entry, no tachypnea  Heart:                     S1, S2 normal, no gallop, no murmur.  Abdomen:              Soft, non-tender, bowel sounds active. No palpable masses.  Extremities:           no cyanosis, no edema. Peripheral pulses normal.  Skin:                      Skin color, texture normal. No rashes.  Neurologic:            Grossly nonfocal.       TELEMETRY:   Normal sinus rhythm with no tachy or delia events     ECG:  Sinus bradycardia. No STT changes.    LABS:                          14.6   7.14  )-----------( 203      ( 05 May 2020 07:04 )             43.4     05-05    141  |  108  |  17  ----------------------------<  116<H>  3.9   |  26  |  0.87    Ca    8.5      05 May 2020 07:04    TPro  7.5  /  Alb  3.8  /  TBili  0.7  /  DBili  x   /  AST  22  /  ALT  31  /  AlkPhos  105  05-05      COVID-19 PCR: NotDetec:  (05.05.20 @ 07:04)      05-05 @ 07:04  Trop-I  0.060  CK      140    05-01 @ 18:07  Trop-I  0.064    05-01 @ 15:05  Trop-I  0.051    Pro BNP  75 05-05 @ 07:04    PT/INR - ( 05 May 2020 07:04 )   PT: 13.1 sec;   INR: 1.17 ratio    PTT - ( 05 May 2020 07:04 )  PTT:37.4 sec      RADIOLOGY & ADDITIONAL STUDIES:    Xray Chest 1 View AP/PA. (05.05.20 @ 07:31) >  IMPRESSION:  Clear lungs.

## 2020-05-05 NOTE — DISCHARGE NOTE PROVIDER - NSDCMRMEDTOKEN_GEN_ALL_CORE_FT
aspirin 81 mg oral tablet: 1 tab(s) orally once a day  Eliquis 5 mg oral tablet: 1 tab(s) orally 2 times a day  resume tonight 5/5/2020  Lipitor 40 mg oral tablet: 1 tab(s) orally once a day (at bedtime)  metoprolol succinate 50 mg oral tablet, extended release: 1 tab(s) orally every 12 hours  Vitamin D3 2000 intl units oral tablet: 1 tab(s) orally once a day

## 2020-05-05 NOTE — DISCHARGE NOTE PROVIDER - HOSPITAL COURSE
70 yo M with pmh Afib on eliquis s/p ablation, hyperlipidemia p/w c/o chest pain x 2 weeks. Patient endorsing SS CP that radiates to left upper extremity with associated jaw pain. Pain is non pleuritic, non positional and not reproducible. Denies cough or fevers. No calf pain or hemoptysis. Recently seen in ED 4 days ago for similarr complaints. Trop was mildly elevated. Trop today 0.06. No dynamic ekg changes    No N/V/D. no diaphoresis. COVID (-)    	    S/p C < from: Cardiac Cath Lab - Adult (05.05.20 @ 10:40) >    Diagnostic Conclusions     Non obstructive coronary artery disease.     Normal LV systolic function. Estimated LV ejection fraction is 65 %.     No aortic valve stenosis.     Elevated left ventricular end-diastolic pressure 20 mm Hg.         Recommendations     Manage with medical therapy.    < end of copied text >                PLAN:    -VS, diet, activity as per post cath orders    -Encourage PO fluids    -Continue current medications; Eliquis can resume tonight    -Plan of care discussed with patient and Dr. Campbell and Dr. Hardy.    -Post cath instructions reviewed, patient verbalizes and understands instructions    - Patient to be discharged home, recommend following up with cardiologist in 2 weeks 70 yo M with pmh Afib on eliquis s/p ablation, hyperlipidemia p/w c/o chest pain x 2 weeks. Patient endorsing SS CP that radiates to left upper extremity with associated jaw pain. Pain is non pleuritic, non positional and not reproducible. Denies cough or fevers. No calf pain or hemoptysis. Recently seen in ED 4 days ago for similarr complaints. Trop was mildly elevated. Trop today 0.06. No dynamic ekg changes    No N/V/D. no diaphoresis. COVID (-)    	    S/p C < from: Cardiac Cath Lab - Adult (05.05.20 @ 10:40) >    Diagnostic Conclusions     Non obstructive coronary artery disease.     Normal LV systolic function. Estimated LV ejection fraction is 65 %.     No aortic valve stenosis.     Elevated left ventricular end-diastolic pressure 20 mm Hg.         Recommendations     Manage with medical therapy.    < end of copied text >                PLAN:    -VS, diet, activity as per post cath orders    -Encourage PO fluids    -Continue current medications; Eliquis can resume tonight    -Plan of care discussed with patient and Dr. Campbell and Dr. Hardy.    -Resume same meds prior to arrival including asa/eliquis, statin    -Post cath instructions reviewed, patient verbalizes and understands instructions    - Patient to be discharged home, recommend following up with cardiologist in 2 weeks 70 yo M with pmh PAfib on eliquis s/p ablation, hyperlipidemia p/w c/o chest pain x 2 weeks. Patient endorsing SS CP that radiates to left upper extremity with associated jaw pain. Pain is non pleuritic, non positional and not reproducible. Denies cough or fevers. No calf pain or hemoptysis. Recently seen in ED 4 days ago for similarr complaints. Trop was mildly elevated. Trop today 0.06. No dynamic ekg changes    No N/V/D. no diaphoresis. COVID (-)    	    S/p C < from: Cardiac Cath Lab - Adult (05.05.20 @ 10:40) >    Diagnostic Conclusions     Non obstructive coronary artery disease.     Normal LV systolic function. Estimated LV ejection fraction is 65 %.     No aortic valve stenosis.     Elevated left ventricular end-diastolic pressure 20 mm Hg.         Recommendations     Manage with medical therapy.    < end of copied text >                PLAN:    -VS, diet, activity as per post cath orders    -Encourage PO fluids    -Continue current medications; Eliquis can resume tonight    -Plan of care discussed with patient and Dr. Campbell and Dr. Hardy.    -Resume same meds prior to arrival including asa/eliquis, statin    -Post cath instructions reviewed, patient verbalizes and understands instructions    - Patient to be discharged home, recommend following up with cardiologist in 2 weeks

## 2020-05-05 NOTE — DISCHARGE NOTE PROVIDER - NSDCCPCAREPLAN_GEN_ALL_CORE_FT
PRINCIPAL DISCHARGE DIAGNOSIS  Diagnosis: NSTEMI (non-ST elevated myocardial infarction)  Assessment and Plan of Treatment: cardiach cath was done, revealing non-obstructive disease.   Continue all medications.   Follow up with Dr. Johnson in 2 week    You may shower tomorrow. , no baths/swimming x one week. No heavy lifting greater than 5-10 pounds with right wrist x one week. No strenuous activity x 3 weeks. Monitor site of procedure and notify your doctor for any redness, swelling, discharge  Bandage: Keep bandage clean and dry. Remove after 24 hours.   Special Instructions: procedure was done in your leg ( groin), limit stair climbing for 48 hours. If you have bleeding from the procedure site: Lie down and remove the bandage. Apply hard pressure and call your doctor. If bleeding and swelling cannot be controlled, call 911Call your doctor immediately if: 1) You have severe pain, swelling, or bruising at the procedure site. A small amount or soreness and bruising (black and blue) is normal. 2) Procedure site becomes very red or feels hot to touch. 3) Your hand becomes blue or feels cold to touch. 4)You feel sudden back or belly pain. 5)You develop fever         SECONDARY DISCHARGE DIAGNOSES  Diagnosis: Atrial fibrillation  Assessment and Plan of Treatment: Continue medications.  Resume Eliquis tonight   Follow up with your PCP/ CArdiologist for further evaluation and management. Please call to make an appointment within  2 weeks of discharge.    Diagnosis: Chest pain, moderate coronary artery risk  Assessment and Plan of Treatment: Continue medications.  Resume Eliquis tonight   Follow up with your PCP/ CArdiologist for further evaluation and management. Please call to make an appointment within  2 weeks of discharge.

## 2020-05-05 NOTE — ED ADULT TRIAGE NOTE - CHIEF COMPLAINT QUOTE
pt presents to the ED complaining of chest pains which started two weeks ago, pt denies SOB, pt denies taking any medications PTA

## 2020-05-05 NOTE — ED PROVIDER NOTE - PHYSICAL EXAMINATION
*GEN: No acute distress, well appearing   *HEAD: Normocephalic, Atraumatic  *EYES/NOSE: b/l Pupils symmetric & Reactive to ligth, EOMI b/l  *THROAT: airway patent, moist mucous membranes  *NECK: Neck supple  *PULMONARY: No Respiratory distress, symmetric b/l chest rise  *CARDIAC: s1s2, regular rhythm   *ABDOMEN:  Non Tender, Non Distended, soft, no guarding, no rebound, no masses   *BACK: no CVA tenderness, No midline vertebral tenderness to palpation   *EXTREMITIES: symmetric pulses, 2+ DP & radial pulses, no cyanosis, no edema   *SKIN: no rash, no bruising   *NEUROLOGIC: alert,  full active & passive ROM in all 4 extremities,   *PSYCH: appropriate concern about symptoms, pleasant

## 2020-05-05 NOTE — CHART NOTE - NSCHARTNOTEFT_GEN_A_CORE
Patient is a 69 year old male with PMHx of Afib on eliquis, last dose 5/4 AM, CAD non obstructive disease. Admitted for CP, NSTEMI 5/1.  CE max 0.067. CP x 2 weeks.   -Consent obtained for cardiac catheterization w/ coronary angiogram and possible stent placement. Pt is competent, has capacity, and understands risks and benefits of procedure. Risks and benefits discussed. Risk discussed included, but not limited to MI, stroke, mortality, major bleeding, arrythmia, or infection. All questions answered         LABS:                         14.6   7.14  )-----------( 203      ( 05 May 2020 07:04 )             43.4     05-05    141  |  108  |  17  ----------------------------<  116<H>  3.9   |  26  |  0.87    Ca    8.5      05 May 2020 07:04    TPro  7.5  /  Alb  3.8  /  TBili  0.7  /  DBili  x   /  AST  22  /  ALT  31  /  AlkPhos  105  05-05    PT/INR - ( 05 May 2020 07:04 )   PT: 13.1 sec;   INR: 1.17 ratio         PTT - ( 05 May 2020 07:04 )  PTT:37.4 sec  CARDIAC MARKERS ( 05 May 2020 07:04 )  0.060 ng/mL / x     / 140 U/L / x     / x        COVID-19 PCR: NotDetec (05 May 2020 07:04)      ASA class: II  Creatinine: 0.87  GFR: 88  Bleeding  Risk score: 1.3% Patient is a 69 year old male with PMHx of Afib on eliquis, last dose 5/4 AM, CAD non obstructive disease. Admitted for CP, NSTEMI 5/1.  CE max 0.067. CP x 2 weeks.   -Consent obtained for cardiac catheterization w/ coronary angiogram and possible stent placement. Pt is competent, has capacity, and understands risks and benefits of procedure. Risks and benefits discussed. Risk discussed included, but not limited to MI, stroke, mortality, major bleeding, arrythmia, or infection. All questions answered       ASA class: II  Creatinine: 0.87  GFR: 88  Bleeding  Risk score: 1.3%      COVID-19 PCR: NotDetec (05 May 2020 07:04)

## 2020-05-07 DIAGNOSIS — Z79.82 LONG TERM (CURRENT) USE OF ASPIRIN: ICD-10-CM

## 2020-05-07 DIAGNOSIS — I21.4 NON-ST ELEVATION (NSTEMI) MYOCARDIAL INFARCTION: ICD-10-CM

## 2020-05-07 DIAGNOSIS — Z98.890 OTHER SPECIFIED POSTPROCEDURAL STATES: ICD-10-CM

## 2020-05-07 DIAGNOSIS — M19.90 UNSPECIFIED OSTEOARTHRITIS, UNSPECIFIED SITE: ICD-10-CM

## 2020-05-07 DIAGNOSIS — I10 ESSENTIAL (PRIMARY) HYPERTENSION: ICD-10-CM

## 2020-05-07 DIAGNOSIS — I48.91 UNSPECIFIED ATRIAL FIBRILLATION: ICD-10-CM

## 2020-05-07 DIAGNOSIS — E78.5 HYPERLIPIDEMIA, UNSPECIFIED: ICD-10-CM

## 2020-05-07 DIAGNOSIS — Z79.01 LONG TERM (CURRENT) USE OF ANTICOAGULANTS: ICD-10-CM

## 2020-05-07 DIAGNOSIS — I25.110 ATHEROSCLEROTIC HEART DISEASE OF NATIVE CORONARY ARTERY WITH UNSTABLE ANGINA PECTORIS: ICD-10-CM

## 2020-06-06 NOTE — CHART NOTE - NSCHARTNOTEFT_GEN_A_CORE
I, Susana Plummer MD attest that, to the best of my knowledge based on clinical presentation and findings documented in the medical record, this patient had chronic atrial fibrillation treated with eliquis 5mg BID.

## 2021-05-10 NOTE — PATIENT PROFILE ADULT - NSPROPASSIVESMOKEEXPOSURE_GEN_A_NUR
[Restricted in physically strenuous activity but ambulatory and able to carry out work of a light or sedentary nature] : Status 1- Restricted in physically strenuous activity but ambulatory and able to carry out work of a light or sedentary nature, e.g., light house work, office work [Thin] : thin [Normal] : affect appropriate No

## 2021-06-14 ENCOUNTER — OUTPATIENT (OUTPATIENT)
Dept: OUTPATIENT SERVICES | Facility: HOSPITAL | Age: 71
LOS: 1 days | Discharge: HOME | End: 2021-06-14
Payer: MEDICARE

## 2021-06-14 ENCOUNTER — RESULT REVIEW (OUTPATIENT)
Age: 71
End: 2021-06-14

## 2021-06-14 VITALS
WEIGHT: 194.01 LBS | SYSTOLIC BLOOD PRESSURE: 128 MMHG | TEMPERATURE: 98 F | DIASTOLIC BLOOD PRESSURE: 70 MMHG | OXYGEN SATURATION: 100 % | HEIGHT: 74 IN | HEART RATE: 52 BPM | RESPIRATION RATE: 14 BRPM

## 2021-06-14 DIAGNOSIS — Z98.890 OTHER SPECIFIED POSTPROCEDURAL STATES: Chronic | ICD-10-CM

## 2021-06-14 DIAGNOSIS — Z12.11 ENCOUNTER FOR SCREENING FOR MALIGNANT NEOPLASM OF COLON: ICD-10-CM

## 2021-06-14 DIAGNOSIS — S62.91XA UNSPECIFIED FRACTURE OF RIGHT WRIST AND HAND, INITIAL ENCOUNTER FOR CLOSED FRACTURE: Chronic | ICD-10-CM

## 2021-06-14 PROCEDURE — 93005 ELECTROCARDIOGRAM TRACING: CPT | Mod: XU

## 2021-06-14 PROCEDURE — 93010 ELECTROCARDIOGRAM REPORT: CPT

## 2021-06-14 PROCEDURE — 88305 TISSUE EXAM BY PATHOLOGIST: CPT

## 2021-06-14 PROCEDURE — 88305 TISSUE EXAM BY PATHOLOGIST: CPT | Mod: 26

## 2021-06-14 RX ORDER — APIXABAN 2.5 MG/1
1 TABLET, FILM COATED ORAL
Qty: 0 | Refills: 0 | DISCHARGE

## 2021-06-14 RX ORDER — ASPIRIN/CALCIUM CARB/MAGNESIUM 324 MG
1 TABLET ORAL
Qty: 0 | Refills: 0 | DISCHARGE

## 2021-06-14 RX ORDER — CHOLECALCIFEROL (VITAMIN D3) 125 MCG
1 CAPSULE ORAL
Qty: 0 | Refills: 0 | DISCHARGE

## 2021-06-14 RX ORDER — ATORVASTATIN CALCIUM 80 MG/1
1 TABLET, FILM COATED ORAL
Qty: 0 | Refills: 0 | DISCHARGE

## 2021-06-14 NOTE — ASU PREOP CHECKLIST - INTERNAL PROSTHESES
Gale Physical Therapist from [facility] called states he just completed re evaluate for Physical therapy for patient per Dr Ridley request.  Patient will be seen 3 times a week for 2 weeks then 2 times a week for 2 weeks then  Be re acessed     During the medication verification patient said he was no longer taking the Aspirin 325 mg  That is listed on the medication list. Patient had said he stopped taking it a while ago.    Gale is asking for clarification if patient should be taking the Aspirin 325 mg.  Patient is asking can he just take a baby aspirin daily if needed.     This nurse reviewed the medication recorded  Noted that patient has aspirin 325 mg  With 2 different  Instructions,  Take  1 tab every other day and take 325 mg tab daily.    [facility] Physicals therapist can be reached at [phone]     Forward to Dr Ridley please advise    no

## 2021-06-18 DIAGNOSIS — Z12.11 ENCOUNTER FOR SCREENING FOR MALIGNANT NEOPLASM OF COLON: ICD-10-CM

## 2021-06-18 DIAGNOSIS — I48.91 UNSPECIFIED ATRIAL FIBRILLATION: ICD-10-CM

## 2021-06-18 DIAGNOSIS — Z79.899 OTHER LONG TERM (CURRENT) DRUG THERAPY: ICD-10-CM

## 2021-06-18 DIAGNOSIS — Z87.891 PERSONAL HISTORY OF NICOTINE DEPENDENCE: ICD-10-CM

## 2021-06-18 DIAGNOSIS — Z86.010 PERSONAL HISTORY OF COLONIC POLYPS: ICD-10-CM

## 2021-06-18 DIAGNOSIS — Z79.01 LONG TERM (CURRENT) USE OF ANTICOAGULANTS: ICD-10-CM

## 2021-06-18 DIAGNOSIS — E78.5 HYPERLIPIDEMIA, UNSPECIFIED: ICD-10-CM

## 2021-06-18 DIAGNOSIS — Z85.828 PERSONAL HISTORY OF OTHER MALIGNANT NEOPLASM OF SKIN: ICD-10-CM

## 2021-06-18 DIAGNOSIS — K64.8 OTHER HEMORRHOIDS: ICD-10-CM

## 2021-06-18 DIAGNOSIS — D12.2 BENIGN NEOPLASM OF ASCENDING COLON: ICD-10-CM

## 2021-06-18 DIAGNOSIS — M19.90 UNSPECIFIED OSTEOARTHRITIS, UNSPECIFIED SITE: ICD-10-CM

## 2021-06-18 DIAGNOSIS — K63.5 POLYP OF COLON: ICD-10-CM

## 2022-02-11 ENCOUNTER — EMERGENCY (EMERGENCY)
Facility: HOSPITAL | Age: 72
LOS: 0 days | Discharge: HOME | End: 2022-02-11
Attending: EMERGENCY MEDICINE
Payer: COMMERCIAL

## 2022-02-11 VITALS
HEART RATE: 69 BPM | RESPIRATION RATE: 19 BRPM | OXYGEN SATURATION: 97 % | DIASTOLIC BLOOD PRESSURE: 79 MMHG | TEMPERATURE: 98 F | SYSTOLIC BLOOD PRESSURE: 159 MMHG

## 2022-02-11 VITALS — HEIGHT: 74 IN | WEIGHT: 199.96 LBS

## 2022-02-11 DIAGNOSIS — S81.812A LACERATION WITHOUT FOREIGN BODY, LEFT LOWER LEG, INITIAL ENCOUNTER: ICD-10-CM
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
## 2022-02-11 DIAGNOSIS — I48.91 UNSPECIFIED ATRIAL FIBRILLATION: ICD-10-CM

## 2022-02-11 DIAGNOSIS — S62.91XA UNSPECIFIED FRACTURE OF RIGHT WRIST AND HAND, INITIAL ENCOUNTER FOR CLOSED FRACTURE: Chronic | ICD-10-CM

## 2022-02-11 DIAGNOSIS — Z98.890 OTHER SPECIFIED POSTPROCEDURAL STATES: Chronic | ICD-10-CM

## 2022-02-11 DIAGNOSIS — E78.5 HYPERLIPIDEMIA, UNSPECIFIED: ICD-10-CM
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
## 2022-02-11 DIAGNOSIS — Z79.01 LONG TERM (CURRENT) USE OF ANTICOAGULANTS: ICD-10-CM

## 2022-02-11 DIAGNOSIS — M19.90 UNSPECIFIED OSTEOARTHRITIS, UNSPECIFIED SITE: ICD-10-CM

## 2022-02-11 PROCEDURE — 99283 EMERGENCY DEPT VISIT LOW MDM: CPT | Mod: 25

## 2022-02-11 PROCEDURE — 73590 X-RAY EXAM OF LOWER LEG: CPT | Mod: LT

## 2022-02-11 PROCEDURE — 12002 RPR S/N/AX/GEN/TRNK2.6-7.5CM: CPT

## 2022-02-11 PROCEDURE — 73590 X-RAY EXAM OF LOWER LEG: CPT | Mod: 26,LT

## 2022-02-11 RX ORDER — CEPHALEXIN 500 MG
1 CAPSULE ORAL
Qty: 15 | Refills: 0
Start: 2022-02-11 | End: 2022-02-15

## 2022-02-11 RX ORDER — CEPHALEXIN 500 MG
500 CAPSULE ORAL ONCE
Refills: 0 | Status: COMPLETED | OUTPATIENT
Start: 2022-02-11 | End: 2022-02-11

## 2022-02-11 RX ADMIN — Medication 500 MILLIGRAM(S): at 15:39

## 2022-02-11 NOTE — ED STATDOCS - NSICDXPASTSURGICALHX_GEN_ALL_CORE_FT
PAST SURGICAL HISTORY:  Hand fracture, right     History of appendectomy     History of arthroscopy of both knees     History of lumbar laminectomy

## 2022-02-11 NOTE — ED STATDOCS - PATIENT PORTAL LINK FT
You can access the FollowMyHealth Patient Portal offered by [facility] by registering at the following website: [email or web address]. By joining [facility]’s FollowMyHealth portal, you will also be able to view your health information using other applications (apps) compatible with our system.

## 2022-02-11 NOTE — ED STATDOCS - NSFOLLOWUPINSTRUCTIONS_ED_ALL_ED_FT
FOLLOW UP WITH YOUR DOCTOR IN 10 DAYS FOR SUTURE CHECK AND REMOVAL. RETURN TO ER FOR ANY WORSENING SYMPTOMS OR NEW CONCERNS.     Laceration    WHAT YOU NEED TO KNOW:    A laceration is an injury to the skin and the soft tissue underneath it. Lacerations happen when you are cut or hit by something. They can happen anywhere on the body.     DISCHARGE INSTRUCTIONS:    Return to the emergency department if:     You have heavy bleeding or bleeding that does not stop after 10 minutes of holding firm, direct pressure over the wound.       Your wound opens up.     Contact your healthcare provider if:     You have a fever or chills.       Your laceration is red, warm, or swollen.      You have red streaks on your skin coming from your wound.      You have white or yellow drainage from the wound that smells bad.      You have pain that gets worse, even after treatment.       You have questions or concerns about your condition or care.     Medicines:     Prescription pain medicine may be given. Ask how to take this medicine safely.       Antibiotics help treat or prevent a bacterial infection.       Take your medicine as directed. Contact your healthcare provider if you think your medicine is not helping or if you have side effects. Tell him or her if you are allergic to any medicine. Keep a list of the medicines, vitamins, and herbs you take. Include the amounts, and when and why you take them. Bring the list or the pill bottles to follow-up visits. Carry your medicine list with you in case of an emergency.    Care for your wound as directed:     Do not get your wound wet until your healthcare provider says it is okay. Do not soak your wound in water. Do not go swimming until your healthcare provider says it is okay. Carefully wash the wound with soap and water. Gently pat the area dry or allow it to air dry.       Change your bandages when they get wet, dirty, or after washing. Apply new, clean bandages as directed. Do not apply elastic bandages or tape too tight. Do not put powders or lotions over your incision.       Apply antibiotic ointment as directed. Your healthcare provider may give you antibiotic ointment to put over your wound if you have stitches. If you have strips of tape over your incision, let them dry up and fall off on their own. If they do not fall off within 14 days, gently remove them. If you have glue over your wound, do not remove or pick at it. If your glue comes off, do not replace it with glue that you have at home.       Check your wound every day for signs of infection such as swelling, redness, or pus.     Self-care:     Apply ice on your wound for 15 to 20 minutes every hour or as directed. Use an ice pack, or put crushed ice in a plastic bag. Cover it with a towel. Ice helps prevent tissue damage and decreases swelling and pain.      Use a splint as directed. A splint will decrease movement and stress on your wound. It may help it heal faster. A splint may be used for lacerations over joints or areas of your body that bend. Ask your healthcare provider how to apply and remove a splint.       Decrease scarring of your wound by applying ointments as directed. Do not apply ointments until your healthcare provider says it is okay. You may need to wait until your wound is healed. Ask which ointment to buy and how often to use it. After your wound is healed, use sunscreen over the area when you are out in the sun. You should do this for at least 6 months to 1 year after your injury.     Follow up with your healthcare provider as directed: You may need to follow up in 24 to 48 hours to have your wound checked for infection. You will need to return in 3 to 14 days if you have stitches or staples so they can be removed. Care for your wound as directed to prevent infection and help it heal. Write down your questions so you remember to ask them during your visits.

## 2022-02-11 NOTE — ED STATDOCS - PROGRESS NOTE DETAILS
signed Tigist Modi PA-C Pt seen initially in intake by Dr. Dalal.   71M with left lower leg lac sustained when he acidentally hit his leg into a glass coffee table while at work around 2pm today. Pt says the glass did not break but his leg was cut through his pants. Pt on eliquis for afib. Tetanus up to date. 6cm L shaped lac through skin of left outer shin. Gross motor/sensation intact. No significant findings on xray. Simple lac repair, abx ppx, f/u PMD for suture removal 10 days. return precautions given. Pt feeling well at DC, agrees with DC and plan of care. Pt ambulates with ease unassisted in ED.

## 2022-02-11 NOTE — ED STATDOCS - OBJECTIVE STATEMENT
72 y/o male with a PMHx of arthritis, Afib on Eliquis, cancer alejandro situ, HLD presents to the ED c/o laceration to left leg. Pt reports he cut his leg on a glass coffee table. Tdap UTD. No other complaints at this time.

## 2022-02-11 NOTE — ED STATDOCS - ATTENDING CONTRIBUTION TO CARE
I, Humza Dalal, performed the initial face to face bedside interview with this patient regarding history of present illness, review of symptoms and relevant past medical, social and family history.  I completed an independent physical examination.  I was the initial provider who evaluated this patient. I have signed out the follow up of any pending tests (i.e. labs, radiological studies) to the ACP.  I have communicated the patient’s plan of care and disposition with the ACP.  The history, relevant review of systems, past medical and surgical history, medical decision making, and physical examination was documented by the scribe in my presence and I attest to the accuracy of the documentation.

## 2022-02-11 NOTE — ED PROCEDURE NOTE - PROCEDURE ADDITIONAL DETAILS
Discussed with pt wound edges not perfectly aligned due to thin/fragile nature of skin and tension on wound. skin likely to tear if sutures pulled too tightly. will result in larger scar. Pt understands and agrees with this. signed Tigist Modi PA-C

## 2022-04-11 NOTE — ED ADULT NURSE NOTE - SUICIDE SCREENING QUESTION 1
SHIFT SUMMARY
84 YR M ADMITTED ON 4/2/22 FOR SI. DNR. SI ISSUE HAS BEEN RESOLVED. NO ACUTE
CHANGES THIS SHIFT. PT WAS ASLEEP WHEN THIS NURSE CAME ON SHIFT AND HAS SLEPT
FOR THE ENTIRE SHIFT. HE HAS SHOWED SLIGHT AGITATION IN HIS SLEEP, BUT HE HAS
NOT HAD ANY VIOLENT OUTBURSTS. MEDS WERE HELP BECAUSE OF PT SLEEPING AND NOT
HAVE HAD ANY SLEEP FOR AT LEAST 2 FULL DAYS. No

## 2022-09-02 NOTE — DISCHARGE NOTE ADULT - DISCHARGE DATE
Include Z78.9 (Other Specified Conditions Influencing Health Status) As An Associated Diagnosis?: No 07-Jan-2019

## 2023-02-17 NOTE — ED ADULT NURSE NOTE - CHIEF COMPLAINT
ACM called but patient did not answer. ACM left message for patient to call back and left call back number. Will follow up at a later date. The patient is a 69y Male complaining of chest pain.

## 2023-02-22 NOTE — DISCHARGE NOTE PROVIDER - NSDCADMDATE_GEN_ALL_CORE_FT
Hematology and Oncology Inpatient Consult Note     Patient: Kathy Saini MRN: [id number]  SSN: [id number]    YOB: 1959  Age: 61 y.o. Sex: male    Chief Complaint: Patient came to ER with increasing shortness of breath and fatigue    Reason for consult: Evaluation and management of patient with severe anemia    Subjective:      Kathy Saini is a 61 y.o. white male who does not go to doctors came to ER after having increasing shortness of breath and chest pain. Patient has history of paraplegia but he is active and living by himself. Recently he found that he is not able to do much at home as compared to before. He was also getting short of breath very easily. He denies any blood in his stool or urine. He does have some chronic pain in his left axilla and lower back pain. He was found to have very severe anemia and I was asked to see him for hematologic evaluation recommendations. Patient has no history of any anemia or blood disorders in the past.  He never had to get any blood transfusion in the past according to patient. His son and daughter are at bedside when I saw patient in ER. 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 After retiring from [employer] he works for [employer]. 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  Patient is currently retired. Social History     Tobacco Use    Smoking status: Not on file    Smokeless tobacco: Not on file   Substance Use Topics    Alcohol use: Not on file   -Patient is ex-smoker. Currently he does not drink alcohol. Family history: His father mother and his sister had  of cancer.       Current Facility-Administered Medications   Medication Dose Route Frequency Provider Last Rate Last Admin    0.9% sodium chloride infusion 250 mL  250 mL 05-May-2020 08:28 IntraVENous PRN Flory Vincent MD        furosemide (LASIX) injection 40 mg  40 mg IntraVENous DAILY Jimbo Contreras MD        sodium chloride (NS) flush 5-40 mL  5-40 mL IntraVENous Q8H Jimbo Contreras MD        sodium chloride (NS) flush 5-40 mL  5-40 mL IntraVENous PRN Jimbo Contreras MD        acetaminophen (TYLENOL) tablet 650 mg  650 mg Oral Q6H PRN Jimbo Contreras MD        Or    acetaminophen (TYLENOL) suppository 650 mg  650 mg Rectal Q6H PRN Jimbo Contreras MD        polyethylene glycol (MIRALAX) packet 17 g  17 g Oral DAILY PRN Jimbo Contreras MD        promethazine (PHENERGAN) tablet 12.5 mg  12.5 mg Oral Q6H PRN Jimbo Contreras MD        Or    ondansetron (ZOFRAN) injection 4 mg  4 mg IntraVENous Q6H PRN Jimbo Contreras MD            No Known Allergies    Review of Systems:  CONSTITUTIONAL: No fever, no chills. No repeated infections. No night sweats. Patient is feeling tired and weak. HEENT: Patient does have hearing impairment. No mouth sores. No Epistaxis. No change in taste or smell sensations. CARDIOVASCULAR: Patient has palpitation and chest pain particularly on minimal exertion. No edema. No syncope. RESPIRATORY: Patient has dyspnea with minimal exertion. No cough. No Hemoptysis. No wheezing. No hoarseness of voice. GI: No nausea or vomiting. No diarrhea, constipation, no bright red blood per rectum. No hematemesis or melena. No weight loss. No dysphagia. : No dysuria, no hematuria. No frequency of urination. INTEGUMENTARY: No skin rash or palpable lumps or bumps. HEMATOLOGIC: No history of easy bruisability. 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 No headache or seizures. MUSCULOSKELETAL: Has back pain from previous injury.     Objective:     Vitals:    02/21/23 1320 02/21/23 1715 02/21/23 1730 02/21/23 1800   BP:  112/61 121/65 122/70   Pulse:  99 100 (!) 105   Resp:  12 12 14   Temp:  98.2 °F (36.8 °C) 98.3 °F (36.8 °C) 98.3 °F (36.8 °C)   SpO2: 100% 100% 100% 100%   Weight:       Height:            Physical Exam:  Constitutional: [de-identified] white male looks older for his age. Not in any acute distress or pain. His complexion is significantly pale. Eyes: Sclerae anicteric. Conjunctivae shows severe pallor. ENMT: Oral mucosa is moist, no thrush, mucositis, or petechiae. Neck: No adenopathy   Hematologic/Lymphatic: Bilateral axillary/inguinal regions showed no adenopathy. Respiratory: Lungs are clear bilaterally. Cardiovascular: Sinus tachycardia with holosystolic murmur. Abdomen: Soft, nontender, no hepatosplenomegaly. No guarding or rigidity. Bowel sounds present. Back/Spine: No spinal tenderness; no costovertebral angle tenderness. Extremities: No edema, cyanosis or clubbing. Skin: No petechiae; no skin rash. Neurologic: Alert/oriented x 3. Has lower extremity weakness.     Recent Results (from the past 24 hour(s))   TYPE & SCREEN    Collection Time: 02/21/23  1:08 PM   Result Value Ref Range    Crossmatch Expiration 02/24/2023,2359     ABO/Rh(D) A Positive     Antibody screen Negative     Unit number [id number]     Blood component type [facility]     Unit division 00     Status of unit [place] to transfuse     Crossmatch result Compatible     Unit number [id number]     Blood component type [facility]     Unit division 00     Status of unit [place] to transfuse     Crossmatch result Compatible     Unit number [id number]     Blood component type [facility]     Unit division 00     Status of unit [address] to transfuse     Crossmatch result Compatible    METABOLIC PANEL, COMPREHENSIVE    Collection Time: 02/21/23  1:09 PM   Result Value Ref Range    Sodium 136 136 - 145 mmol/L    Potassium 4.2 3.5 - 5.1 mmol/L    Chloride 108 97 - 108 mmol/L    CO2 17 (L) 21 - 32 mmol/L    Anion gap 11 5 - 15 mmol/L    Glucose 100 65 - 100 mg/dL    BUN 18 6 - 20 mg/dL    Creatinine 0.78 0.70 - 1.30 mg/dL    BUN/Creatinine ratio 23 (H) 12 - 20      eGFR >60 >60 ml/min/1.73m2    Calcium 8.7 8.5 - 10.1 mg/dL    Bilirubin, total 1.0 0.2 - 1.0 mg/dL    AST (SGOT) 277 (H) 15 - 37 U/L    ALT (SGPT) 466 (H) 12 - 78 U/L    Alk. phosphatase 187 (H) 45 - 117 U/L    Protein, total 7.1 6.4 - 8.2 g/dL    Albumin 3.5 3.5 - 5.0 g/dL    Globulin 3.6 2.0 - 4.0 g/dL    A-G Ratio 1.0 (L) 1.1 - 2.2     TROPONIN-HIGH SENSITIVITY    Collection Time: 02/21/23  1:09 PM   Result Value Ref Range    Troponin-High Sensitivity 29 0 - 76 ng/L   D DIMER    Collection Time: 02/21/23  2:32 PM   Result Value Ref Range    D DIMER 1.84 (H) <0.50 ug/ml(FEU)   COVID-19 RAPID TEST    Collection Time: 02/21/23  2:32 PM   Result Value Ref Range    COVID-19 rapid test Not Detected Not Detected     INFLUENZA A & B AG (RAPID TEST)    Collection Time: 02/21/23  2:32 PM   Result Value Ref Range    Influenza A Antigen Negative Negative      Influenza B Antigen Negative Negative     NT-PRO BNP    Collection Time: 02/21/23  2:32 PM   Result Value Ref Range    NT pro-BNP 1,621 (H) <125 pg/mL   CBC WITH AUTOMATED DIFF    Collection Time: 02/21/23  3:47 PM   Result Value Ref Range    WBC 9.3 4.1 - 11.1 K/uL    RBC 1.43 (L) 4.10 - 5.70 M/uL    HGB 2.0 (LL) 12.1 - 17.0 g/dL    HCT 8.2 (LL) 36.6 - 50.3 %    MCV 57.3 (L) 80.0 - 99.0 FL    MCH 14.0 (L) 26.0 - 34.0 PG    MCHC 24.4 (L) 30.0 - 36.5 g/dL    RDW 22.4 (H) 11.5 - 14.5 %    PLATELET 191 715 - 685 K/uL    MPV 8.8 (L) 8.9 - 12.9 FL    NRBC 2.9 (H) 0.0  WBC    ABSOLUTE NRBC 0.27 (H) 0.00 - 0.01 K/uL    NEUTROPHILS 74 32 - 75 %    LYMPHOCYTES 12 12 - 49 %    MONOCYTES 13 5 - 13 %    EOSINOPHILS 0 0 - 7 %    BASOPHILS 0 0 - 1 %    IMMATURE GRANULOCYTES 1 (H) 0 - 0.5 %    ABS. NEUTROPHILS 6.9 1.8 - 8.0 K/UL    ABS. LYMPHOCYTES 1.1 0.8 - 3.5 K/UL    ABS. MONOCYTES 1.2 (H) 0.0 - 1.0 K/UL    ABS. EOSINOPHILS 0.0 0.0 - 0.4 K/UL    ABS. BASOPHILS 0.0 0.0 - 0.1 K/UL    ABS. GIOVANNI Avila See. 0.1 (H) 0.00 - 0.04 K/UL    DF Smear Scanned      RBC COMMENTS Anisocytosis  2+        RBC COMMENTS Hypochromia  4+        RBC COMMENTS Microcytosis  3+        RBC COMMENTS Polychromasia  1+        RBC COMMENTS Schistocytes  Present            XR CHEST SNGL V   Final Result   Central pulmonary vascular congestion versus perihilar opacities   that can be seen with a viral process. CTA CHEST W OR W WO CONT    (Results Pending)          Assessment:     Hospital Problems  Never Reviewed            Codes Class Noted POA    Severe anemia ICD-10-CM: D64.9  ICD-9-CM: 285.9  2/21/2023 Unknown           Assessment & Plan:     60-year-old white male with history of COPD and paraplegia came with shortness of breath and was found to have symptomatic anemia. Patient has no visible bleeding and has no history of previous anemia. Just based on his overall symptomatology I think his anemia must be chronic and must have developed over a long time. He does have anemia symptoms but not as bad as his numbers look. 1) anemia: I definitely agree with packed RBC transfusion. We have started anemia work-up and based on the work-up results we will make further recommendations.  -His peripheral smear on 21 February shows significant hypochromia and microcytosis, occasional nucleated RBCs, schistocytes, fragmented RBCs, occasional teardrop RBCs. There is significant anisocytosis and poikilocytosis is seen. There are no blast cells are seen.    -I had a very long discussion with patient and his son and daughter regarding his severe anemia and my current impression. We also discussed about further diagnostic plan and explained to them that if noninvasive work-up does not reveal the cause of his anemia then we may even consider doing bone marrow biopsy and aspiration.  -While we are giving him packed RBC transfusion we will have to be careful with fluid overload.   Patient may also develop delusional thrombocytopenia because of significant packed RBC transfusion.    -Given patient's significant anemia his condition is critical and I have explained that to patient. Case was discussed with attending physician Dr. Areli Hatch. This dictation was done by dragon, computer voice recognition software. Often unanticipated grammatical, syntax, phones and other interpretive errors are inadvertently transcribed. Please excuse errors that have escaped final proofreading.      Signed By: Brian Whitman MD     February 21, 2023

## 2023-04-24 NOTE — ED STATDOCS - DISPOSITION TYPE
We attempted to reach you for your scheduled PAT appointment but you were not available. Please contact Endoscopy Scheduling at # [phone]   
DISCHARGE

## 2023-09-25 NOTE — ED ADULT NURSE NOTE - NSSUHOSCREENINGYN_ED_ALL_ED
Problem: Discharge Planning  Goal: Discharge to home or other facility with appropriate resources  Outcome: Progressing  Flowsheets (Taken 9/25/2023 0319)  Discharge to home or other facility with appropriate resources:   Identify barriers to discharge with patient and caregiver   Identify discharge learning needs (meds, wound care, etc)     Problem: Safety - Adult  Goal: Free from fall injury  Outcome: Progressing  Note: Patient has periods of confusion. Safety precautions in place. Bed is in low and locked position. Bed alarm set.  Call light and bedside table with in reach Yes - the patient is able to be screened

## 2024-01-15 ENCOUNTER — EMERGENCY (EMERGENCY)
Facility: HOSPITAL | Age: 74
LOS: 0 days | Discharge: HOME | End: 2024-01-15
Attending: STUDENT IN AN ORGANIZED HEALTH CARE EDUCATION/TRAINING PROGRAM
Payer: MEDICARE

## 2024-01-15 VITALS — HEIGHT: 72 IN | WEIGHT: 192.02 LBS

## 2024-01-15 VITALS
OXYGEN SATURATION: 100 % | HEART RATE: 81 BPM | SYSTOLIC BLOOD PRESSURE: 146 MMHG | DIASTOLIC BLOOD PRESSURE: 81 MMHG | RESPIRATION RATE: 16 BRPM

## 2024-01-15 DIAGNOSIS — S62.91XA UNSPECIFIED FRACTURE OF RIGHT WRIST AND HAND, INITIAL ENCOUNTER FOR CLOSED FRACTURE: Chronic | ICD-10-CM

## 2024-01-15 DIAGNOSIS — Z79.01 LONG TERM (CURRENT) USE OF ANTICOAGULANTS: ICD-10-CM
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
## 2024-01-15 DIAGNOSIS — Z98.890 OTHER SPECIFIED POSTPROCEDURAL STATES: Chronic | ICD-10-CM

## 2024-01-15 DIAGNOSIS — I48.91 UNSPECIFIED ATRIAL FIBRILLATION: ICD-10-CM

## 2024-01-15 DIAGNOSIS — S60.812A ABRASION OF LEFT WRIST, INITIAL ENCOUNTER: ICD-10-CM

## 2024-01-15 DIAGNOSIS — M25.532 PAIN IN LEFT WRIST: ICD-10-CM

## 2024-01-15 PROCEDURE — 73110 X-RAY EXAM OF WRIST: CPT | Mod: LT

## 2024-01-15 PROCEDURE — 73110 X-RAY EXAM OF WRIST: CPT | Mod: 26,LT

## 2024-01-15 PROCEDURE — 99284 EMERGENCY DEPT VISIT MOD MDM: CPT | Mod: 25

## 2024-01-15 PROCEDURE — 73090 X-RAY EXAM OF FOREARM: CPT | Mod: LT

## 2024-01-15 PROCEDURE — 73130 X-RAY EXAM OF HAND: CPT | Mod: 26,LT

## 2024-01-15 PROCEDURE — 99284 EMERGENCY DEPT VISIT MOD MDM: CPT | Mod: GC

## 2024-01-15 PROCEDURE — 73090 X-RAY EXAM OF FOREARM: CPT | Mod: 26,LT

## 2024-01-15 PROCEDURE — 73130 X-RAY EXAM OF HAND: CPT | Mod: LT

## 2024-01-15 NOTE — ED STATDOCS - OBJECTIVE STATEMENT
73M CC wrist pain s/p fall yesterday Patient was walking up the stairs tripped onto the ledge of the stair.  Patient denies any LOC states it was mechanical in nature.  Patient is up-to-date with his tetanus shot.  denies hitting head   denies any sob n/v   Patient is on Eliquis for A-fib

## 2024-01-15 NOTE — ED ADULT TRIAGE NOTE - CHIEF COMPLAINT QUOTE
PT presents to er with complaints of mechanical fall yesterday falling onto his left wrist, denies head strike, use of ac.

## 2024-01-15 NOTE — ED STATDOCS - PROGRESS NOTE DETAILS
Davi Oviedo for Dr. Lambert: Agree with resident note.  73-year-old male with injury to the distal ulna after tripping and falling, striking it on a ledge.  Patient is left-hand dominant.  Has small abrasion to the distal ulna with some ecchymosis and tenderness.  He is neurovascularly intact otherwise.  Plan for x-ray, pain control, splint for comfort. Mary PGY 3 Discussed lab and radiology findings with patient. Patient feels comfortable going home. Gave home care and follow up instructions. Discussed which symptoms to look out for and when to return to the ED for further evaluation. Patient given opportunity to ask questions about their medical condition and had all questions answered.   pt to fu w/ ortho Mray PGY 3 Discussed lab and radiology findings with patient. Patient feels comfortable going home. Gave home care and follow up instructions. Discussed which symptoms to look out for and when to return to the ED for further evaluation. Patient given opportunity to ask questions about their medical condition and had all questions answered.   pt to fu w/ ortho

## 2024-01-15 NOTE — ED STATDOCS - NSFOLLOWUPINSTRUCTIONS_ED_ALL_ED_FT
Wrist Pain, Adult      There are many things that can cause wrist pain. Some common causes include:  •An injury to the wrist area, such as a sprain, strain, or fracture.      •Overuse of the joint.      •A condition that causes increased pressure on a nerve in the wrist (carpal tunnel syndrome).      •Wear and tear of the joints that occurs with aging (osteoarthritis).      •Other types of joint inflammation and stiffness (arthritis).      Sometimes, the cause of wrist pain is not known. Often, the pain goes away when you follow instructions from your health care provider for relieving pain at home, such as resting the wrist, icing the wrist, or using a splint or an elastic wrap for a short time. If your wrist pain continues, it is important to tell your health care provider.      Follow these instructions at home:    If you have a splint or elastic wrap:     •Wear the splint or wrap as told by your health care provider. Remove it only as told by your health care provider. Ask your health care provider if you may remove it for bathing.      •Loosen the splint or wrap if your fingers tingle, become numb, or turn cold and blue.      •Check the skin around the splint or wrap every day. Tell your health care provider about any concerns.      •Keep the splint or wrap clean.    •If the splint or wrap is not waterproof:  •Do not let it get wet.      •Cover it with a watertight covering when you take a bath or shower.          Managing pain, stiffness, and swelling    •If directed, put ice on the painful area. To do this:  •If you have a removable splint or wrap, remove it as told by your health care provider.      •Put ice in a plastic bag.      •Place a towel between your skin and the bag or between your splint or wrap and the bag.      •Leave the ice on for 20 minutes, 2–3 times a day.        •Move your fingers often to reduce stiffness and swelling.      •Raise (elevate) the injured area above the level of your heart while you are sitting or lying down.      Activity     •Rest your affected wrist as told by your health care provider.      •Return to your normal activities as told by your health care provider. Ask your health care provider what activities are safe for you.      •Ask your health care provider when it is safe to drive if you have a splint or wrap on your wrist.      •Do exercises as told by your health care provider.      General instructions     •Pay attention to any changes in your symptoms.      •Take over-the-counter and prescription medicines only as told by your health care provider.      •Keep all follow-up visits as told by your health care provider. This is important.        Contact a health care provider if:    •You have a sudden, sharp pain in the wrist, hand, or arm that is different or new.      •The swelling or bruising on your wrist or hand gets worse.      •Your skin becomes red, gets a rash, or has open sores.      •Your pain does not get better or it gets worse.      •You have a fever or chills.        Get help right away if:    •You lose feeling in your fingers or hand.      •Your fingers turn white, very red, or cold and blue.      •You cannot move your fingers.        Summary    •Wrist pain in an adult has many different causes.      •If your wrist pain continues, it is important to tell your health care provider.      •You may need to wear a splint or an elastic wrap for a short period of time.      •Return to your normal activities as told by your health care provider. Ask your health care provider what activities are safe for you.      This information is not intended to replace advice given to you by your health care provider. Make sure you discuss any questions you have with your health care provider.

## 2024-01-15 NOTE — ED STATDOCS - PHYSICAL EXAMINATION
GENERAL: Awake, alert, NAD  HEENT: NC/AT, moist mucous membranes, PERRL, EOMI  LUNGS: CTAB, no wheezes or crackles   CARDIAC: RRR, no m/r/g  ABDOMEN: Soft, , non tender, non distended, no rebound, no guarding  BACK: No midline spinal tenderness, no CVA tenderness  EXT:Muscular vascular intact full strength of the left and right upper extremities small abrasion overlying the posterior medial aspect of the wrist  full rom of shoulder elbow and left wrist   NEURO: A&Ox3. Moving all extremities.  SKIN: Warm and dry. No rash.  PSYCH: Normal affect.

## 2024-01-15 NOTE — ED ADULT NURSE REASSESSMENT NOTE - NS ED NURSE REASSESS COMMENT FT1
Pt seen by writing RN for discharge only. Pt has +movement, +sensation, cap refill less than 3 secs. with ace wrap in place. Pt instructed on s/s of need to return to ED, otherwise, f/u with Ortho outpatient. Pt ambulates with a steady gait in no distress upon leaving ED.

## 2024-01-15 NOTE — ED STATDOCS - PATIENT PORTAL LINK FT
You can access the FollowMyHealth Patient Portal offered by [facility] by registering at the following website: [email or web address]. By joining [employer]’s FollowMyHealth portal, you will also be able to view your health information using other applications (apps) compatible with our system. You can access the FollowMyHealth Patient Portal offered by [facility] by registering at the following website: [email or web address]. By joining [employer]’s FollowMyHealth portal, you will also be able to view your health information using other applications (apps) compatible with our system. You can access the FollowMyHealth Patient Portal offered by [facility] by registering at the following website: [email or web address]. By joining [employer]’s FollowMyHealth portal, you will also be able to view your health information using other applications (apps) compatible with our system.

## 2024-01-15 NOTE — ED STATDOCS - NSFOLLOWUPCLINICS_GEN_ALL_ED_FT
[facility]  Orthopedic Surgery  [address] [address]  Phone: [phone]  Fax:     [facility]  Orthopedic Surgery  [address] [place] [address]  Phone: [phone]  Fax:      [facility]  Orthopedic Surgery  [address] [address]  Phone: [phone]  Fax:     [facility]  Orthopedic Surgery  [address] [place] [address]  Phone: [phone]  Fax:      [facility]  Orthopedic Surgery  [address] [address]  Phone: [phone]  Fax:     [facility]  Orthopedic Surgery  [address] [place] [address]  Phone: [phone]  Fax:

## 2024-01-15 NOTE — ED STATDOCS - CLINICAL SUMMARY MEDICAL DECISION MAKING FREE TEXT BOX
Given history and physical possibility of radius/wrist fracture will assess with x-rays patient is already up-to-date with tetanus vaccination likely DC if broken with orthopedic follow-up pending x-ray results

## 2024-01-15 NOTE — ED PROCEDURE NOTE - ATTENDING CONTRIBUTION TO CARE
I, Kyle Lambert DO, personally made/approved the management plan and take responsibility for the patient management. I performed the initial face to face bedside interview with this patient regarding history of present illness, review of symptoms and relevant past medical, social and family history.  I completed an independent physical examination.  I was the initial provider who evaluated this patient. I have signed out the follow up of any pending tests (i.e. labs, radiological studies) to the resident.  I have communicated the patient’s plan of care and disposition with the resident.  The history, relevant review of systems, past medical and surgical history, medical decision making, and physical examination was documented by the scribe in my presence and I attest to the accuracy of the documentation.

## 2024-01-15 NOTE — ED STATDOCS - NSFOLLOWUPCLINICSTOKEN_GEN_ALL_ED_FT
[id number]: || ||00\01||False;[id number]: || ||00\01||False; [id number]: || ||00\01||False;[id number]: || ||00\01||False; [id number]: || ||00\01||False;[id number]: || ||00\01||False;

## 2024-03-04 NOTE — ED STATDOCS - CPE ED MUSC NORM
[facility]  Nurses Note -- 4 Eyes      3/4/2024       Skin assessed on: Q Shift      [x] No Pressure Injuries Present    []Prevention Measures Documented    [] Yes LDA  for Pressure Injury Previously documented     [] Yes New Pressure Injury Discovered   [] LDA for New Pressure Injury Added      Attending RN:  Ángel Devlin RN     Second RN:  Marianela Murphy RN        normal...

## 2024-06-06 NOTE — ED PROCEDURE NOTE - DEPTH OF REPAIR FOR WOUND CLOSURE
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

## 2024-07-30 NOTE — ED ADULT TRIAGE NOTE - DOMESTIC TRAVEL HIGH RISK QUESTION
Continued Stay SW/CM Assessment/Plan of Care Note       Active Substitute Decision Maker (SDM)    There are no active Substitute Decision Maker (SDM) on file.           Progress note:  Reviewed the pt's medical chart. S/p right femur retrograde nail on 7/27/24 with Dr Linares.  Afebrile overnight, BC pending.   Collaborated with the pt, Dr. Lei, India Castillo PA and RN to discuss the pt's plan of care.    [facility] has insurance approval, update provided to Keira.       Continue to follow for discharge planning.  See SW/CM flowsheets for other objective data.    Disposition Recommendations:  Preliminary discharge destination: Planned Discharge Destination: Rehabilitation/Skilled Care  SW/CM recommendation for discharge: Sub-acute nursing home    Destination Pharmacy: [facility] [address] confirmed with facility, Preferred pharmacy updated    Discharge Plan/Needs:     Continued Care and Services - Admitted Since 7/26/2024       Destination  Coordination complete.      Service Provider Request Status Selected Services Address Phone Fax    [facility]  Selected Skilled Nursing [address] [facility] [id number] [phone]    [facility] SNF Accepted N/A [id number] [facility] [id number] [phone]                  Continued Care and Services - Linked Episodes Includes continued care and service providers from the active episodes linked to this encounter, which are listed below      Care Transitions Episode start date: 7/29/2024   There are no active outsourced providers for this episode.                     Prior To Hospitalization:    Living Situation: Spouse and residing at House    .  Support Systems: Children, Family members, Friends   Home Devices/Equipment: Blood pressure monitor, Mobility assist device            Mobility Assist Devices: Front-wheeled walker    Type of Service Prior to Hospitalization: None               Patient/Family discharge goal (s):  Sub-acute nursing home     Resources provided:   Skilled Nursing Facility           Current Function  Last Filed Values         Value Time User    Current Function  significantly below baseline level of function 7/29/2024 12:25 PM Ronnie Branch PTA    Therapy Impairments  strength; ROM; pain; balance; activity tolerance; safety awareness; coordination 7/29/2024 12:25 PM Ronnie Branch PTA    ADLs Requiring Support  bed mobility; transfers; ambulation; stairs 7/28/2024 11:18 AM Shruthi Mcmahon PT            Therapy Recommendations for Discharge:   PT:      Last Filed Values         Value Time User    PT Discharge Needs  therapy 5 or more times per week 7/29/2024 12:25 PM Ronnie Branch PTA          OT:       Last Filed Values         Value Time User    OT Discharge Needs  therapy 5 or more times per week 7/30/2024 11:55 AM Joan Martin OT          SLP:    Last Filed Values       None            Mobility Equipment Recommended for Discharge: Owns 4WW      Barriers to Discharge  Identified Barriers to Discharge/Transition Planning: Medical necessity for acute care                   No

## 2025-06-12 ENCOUNTER — OFFICE (OUTPATIENT)
Dept: URBAN - METROPOLITAN AREA CLINIC 100 | Facility: CLINIC | Age: 75
Setting detail: OPHTHALMOLOGY
End: 2025-06-12
Payer: MEDICARE

## 2025-06-12 DIAGNOSIS — H02.834: ICD-10-CM

## 2025-06-12 DIAGNOSIS — H53.40: ICD-10-CM

## 2025-06-12 DIAGNOSIS — H02.831: ICD-10-CM

## 2025-06-12 DIAGNOSIS — H57.813: ICD-10-CM

## 2025-06-12 PROBLEM — H52.7 REFRACTIVE ERROR: Status: ACTIVE | Noted: 2025-06-12

## 2025-06-12 PROCEDURE — 92081 LIMITED VISUAL FIELD XM: CPT | Performed by: OPHTHALMOLOGY

## 2025-06-12 PROCEDURE — 92004 COMPRE OPH EXAM NEW PT 1/>: CPT | Performed by: OPHTHALMOLOGY

## 2025-06-12 PROCEDURE — 92285 EXTERNAL OCULAR PHOTOGRAPHY: CPT | Performed by: OPHTHALMOLOGY

## 2025-06-12 ASSESSMENT — LID POSITION - DERMATOCHALASIS
OS_DERMATOCHALASIS: LUL
OD_DERMATOCHALASIS: RUL

## 2025-06-12 ASSESSMENT — REFRACTION_AUTOREFRACTION
OD_SPHERE: +1.25
OS_AXIS: 164
OS_CYLINDER: -2.00
OS_SPHERE: +1.00
OD_CYLINDER: -2.25
OD_AXIS: 030

## 2025-06-12 ASSESSMENT — CONFRONTATIONAL VISUAL FIELD TEST (CVF)
OS_FINDINGS: FULL
OD_FINDINGS: FULL

## 2025-06-12 ASSESSMENT — VISUAL ACUITY
OS_BCVA: 20/20-2
OD_BCVA: 20/20

## 2025-06-12 ASSESSMENT — KERATOMETRY
METHOD_AUTO_MANUAL: AUTO
OD_AXISANGLE_DEGREES: 110
OD_K1POWER_DIOPTERS: 41.00
OS_AXISANGLE_DEGREES: 075
OS_K2POWER_DIOPTERS: 42.75
OD_K2POWER_DIOPTERS: 43.00
OS_K1POWER_DIOPTERS: 40.75

## 2025-07-21 ENCOUNTER — OFFICE (OUTPATIENT)
Dept: URBAN - METROPOLITAN AREA CLINIC 100 | Facility: CLINIC | Age: 75
Setting detail: OPHTHALMOLOGY
End: 2025-07-21
Payer: MEDICARE

## 2025-07-21 ENCOUNTER — RX ONLY (RX ONLY)
Age: 75
End: 2025-07-21

## 2025-07-21 DIAGNOSIS — H04.122: ICD-10-CM

## 2025-07-21 DIAGNOSIS — H02.834: ICD-10-CM

## 2025-07-21 DIAGNOSIS — H57.813: ICD-10-CM

## 2025-07-21 DIAGNOSIS — H04.121: ICD-10-CM

## 2025-07-21 DIAGNOSIS — H02.831: ICD-10-CM

## 2025-07-21 DIAGNOSIS — H53.40: ICD-10-CM

## 2025-07-21 PROCEDURE — 83861 MICROFLUID ANALY TEARS: CPT | Mod: QW,RT | Performed by: OPHTHALMOLOGY

## 2025-07-21 PROCEDURE — 92014 COMPRE OPH EXAM EST PT 1/>: CPT | Performed by: OPHTHALMOLOGY

## 2025-07-21 PROCEDURE — 83861 MICROFLUID ANALY TEARS: CPT | Mod: QW,LT | Performed by: OPHTHALMOLOGY

## 2025-07-21 RX ORDER — ERYTHROMYCIN 5 MG/G
OINTMENT OPHTHALMIC
Qty: 1 | Refills: 1 | Status: ACTIVE | OUTPATIENT

## 2025-07-21 ASSESSMENT — LID POSITION - DERMATOCHALASIS
OD_DERMATOCHALASIS: RUL
OS_DERMATOCHALASIS: LUL

## 2025-07-21 ASSESSMENT — VISUAL ACUITY
OD_BCVA: 20/20
OS_BCVA: 20/25-2

## 2025-07-21 ASSESSMENT — REFRACTION_AUTOREFRACTION
OS_CYLINDER: -2.00
OS_SPHERE: +1.00
OD_AXIS: 030
OD_CYLINDER: -2.25
OS_AXIS: 164
OD_SPHERE: +1.25

## 2025-07-21 ASSESSMENT — KERATOMETRY
OD_AXISANGLE_DEGREES: 110
OD_K2POWER_DIOPTERS: 43.00
OS_K2POWER_DIOPTERS: 42.75
OS_K1POWER_DIOPTERS: 40.75
OD_K1POWER_DIOPTERS: 41.00
OS_AXISANGLE_DEGREES: 075
METHOD_AUTO_MANUAL: AUTO

## 2025-07-21 ASSESSMENT — CONFRONTATIONAL VISUAL FIELD TEST (CVF)
OD_FINDINGS: FULL
OS_FINDINGS: FULL

## 2025-07-21 ASSESSMENT — TEAR BREAK UP TIME (TBUT)
OS_TBUT: 1+
OD_TBUT: 1+

## 2025-07-28 ENCOUNTER — RX ONLY (RX ONLY)
Age: 75
End: 2025-07-28

## 2025-07-28 ENCOUNTER — OFFICE (OUTPATIENT)
Dept: URBAN - METROPOLITAN AREA CLINIC 100 | Facility: CLINIC | Age: 75
Setting detail: OPHTHALMOLOGY
End: 2025-07-28
Payer: MEDICARE

## 2025-07-28 DIAGNOSIS — H02.831: ICD-10-CM

## 2025-07-28 DIAGNOSIS — H02.834: ICD-10-CM

## 2025-07-28 PROCEDURE — 15823 BLEPHARP UPR EYELID XCSV SKN: CPT | Mod: 50 | Performed by: OPHTHALMOLOGY

## 2025-07-28 ASSESSMENT — KERATOMETRY
OS_AXISANGLE_DEGREES: 075
OD_K2POWER_DIOPTERS: 43.00
OD_K1POWER_DIOPTERS: 41.00
OD_AXISANGLE_DEGREES: 110
METHOD_AUTO_MANUAL: AUTO
OS_K1POWER_DIOPTERS: 40.75
OS_K2POWER_DIOPTERS: 42.75

## 2025-07-28 ASSESSMENT — REFRACTION_AUTOREFRACTION
OD_SPHERE: +1.25
OD_AXIS: 030
OS_AXIS: 164
OS_CYLINDER: -2.00
OD_CYLINDER: -2.25
OS_SPHERE: +1.00

## 2025-07-28 ASSESSMENT — VISUAL ACUITY
OD_BCVA: 20/20
OS_BCVA: 20/25-2

## 2025-08-04 ENCOUNTER — OFFICE (OUTPATIENT)
Dept: URBAN - METROPOLITAN AREA CLINIC 100 | Facility: CLINIC | Age: 75
Setting detail: OPHTHALMOLOGY
End: 2025-08-04
Payer: MEDICARE

## 2025-08-04 DIAGNOSIS — H02.831: ICD-10-CM

## 2025-08-04 DIAGNOSIS — H02.834: ICD-10-CM

## 2025-08-04 DIAGNOSIS — H57.813: ICD-10-CM

## 2025-08-04 PROCEDURE — 99024 POSTOP FOLLOW-UP VISIT: CPT | Performed by: OPHTHALMOLOGY

## 2025-08-04 ASSESSMENT — LID POSITION - DERMATOCHALASIS
OD_DERMATOCHALASIS: ABSENT
OS_DERMATOCHALASIS: ABSENT

## 2025-08-04 ASSESSMENT — CONFRONTATIONAL VISUAL FIELD TEST (CVF)
OS_FINDINGS: FULL
OD_FINDINGS: FULL

## 2025-08-04 ASSESSMENT — KERATOMETRY
OS_K2POWER_DIOPTERS: 42.75
OS_K1POWER_DIOPTERS: 40.75
OD_K1POWER_DIOPTERS: 41.00
OS_AXISANGLE_DEGREES: 075
OD_K2POWER_DIOPTERS: 43.00
METHOD_AUTO_MANUAL: AUTO
OD_AXISANGLE_DEGREES: 110

## 2025-08-04 ASSESSMENT — REFRACTION_AUTOREFRACTION
OS_SPHERE: +1.00
OD_SPHERE: +1.25
OD_AXIS: 030
OS_CYLINDER: -2.00
OD_CYLINDER: -2.25
OS_AXIS: 164

## 2025-08-04 ASSESSMENT — VISUAL ACUITY
OD_BCVA: 20/20-2
OS_BCVA: 20/20-

## 2025-08-04 ASSESSMENT — TEAR BREAK UP TIME (TBUT)
OD_TBUT: 1+
OS_TBUT: 1+

## 2025-08-04 ASSESSMENT — LID POSITION - COMMENTS
OD_COMMENTS: INCISION INTACT
OS_COMMENTS: INCISION INTACT